# Patient Record
Sex: MALE | Race: WHITE | Employment: OTHER | URBAN - METROPOLITAN AREA
[De-identification: names, ages, dates, MRNs, and addresses within clinical notes are randomized per-mention and may not be internally consistent; named-entity substitution may affect disease eponyms.]

---

## 2022-09-28 ENCOUNTER — HOSPITAL ENCOUNTER (OUTPATIENT)
Dept: PREADMISSION TESTING | Age: 72
Discharge: HOME | End: 2022-09-28
Payer: MEDICARE

## 2022-09-28 ENCOUNTER — HOSPITAL ENCOUNTER (OUTPATIENT)
Dept: GENERAL RADIOLOGY | Age: 72
Discharge: HOME | End: 2022-09-28
Attending: UROLOGY
Payer: MEDICARE

## 2022-09-28 VITALS — BODY MASS INDEX: 29.37 KG/M2 | WEIGHT: 193.78 LBS | HEIGHT: 68 IN

## 2022-09-28 LAB
ABO + RH BLD: NORMAL
ALBUMIN SERPL-MCNC: 3.8 G/DL (ref 3.5–5)
ALBUMIN/GLOB SERPL: 1.1 {RATIO} (ref 1.1–2.2)
ALP SERPL-CCNC: 96 U/L (ref 45–117)
ALT SERPL-CCNC: 26 U/L (ref 12–78)
ANION GAP SERPL CALC-SCNC: 4 MMOL/L (ref 5–15)
APPEARANCE UR: CLEAR
APTT PPP: 27.9 SEC (ref 22.1–31)
AST SERPL-CCNC: 22 U/L (ref 15–37)
ATRIAL RATE: 75 BPM
BACTERIA URNS QL MICRO: NEGATIVE /HPF
BILIRUB SERPL-MCNC: 0.5 MG/DL (ref 0.2–1)
BILIRUB UR QL: NEGATIVE
BLOOD GROUP ANTIBODIES SERPL: NORMAL
BUN SERPL-MCNC: 19 MG/DL (ref 6–20)
BUN/CREAT SERPL: 15 (ref 12–20)
CALCIUM SERPL-MCNC: 8.9 MG/DL (ref 8.5–10.1)
CALCULATED P AXIS, ECG09: 37 DEGREES
CALCULATED R AXIS, ECG10: -13 DEGREES
CALCULATED T AXIS, ECG11: 41 DEGREES
CHLORIDE SERPL-SCNC: 106 MMOL/L (ref 97–108)
CO2 SERPL-SCNC: 29 MMOL/L (ref 21–32)
COLOR UR: ABNORMAL
CREAT SERPL-MCNC: 1.26 MG/DL (ref 0.7–1.3)
DIAGNOSIS, 93000: NORMAL
EPITH CASTS URNS QL MICRO: ABNORMAL /LPF
ERYTHROCYTE [DISTWIDTH] IN BLOOD BY AUTOMATED COUNT: 12.3 % (ref 11.5–14.5)
EST. AVERAGE GLUCOSE BLD GHB EST-MCNC: 123 MG/DL
GLOBULIN SER CALC-MCNC: 3.4 G/DL (ref 2–4)
GLUCOSE SERPL-MCNC: 102 MG/DL (ref 65–100)
GLUCOSE UR STRIP.AUTO-MCNC: NEGATIVE MG/DL
HBA1C MFR BLD: 5.9 % (ref 4–5.6)
HCT VFR BLD AUTO: 45.2 % (ref 36.6–50.3)
HGB BLD-MCNC: 15.1 G/DL (ref 12.1–17)
HGB UR QL STRIP: NEGATIVE
HYALINE CASTS URNS QL MICRO: ABNORMAL /LPF (ref 0–2)
INR PPP: 1 (ref 0.9–1.1)
KETONES UR QL STRIP.AUTO: ABNORMAL MG/DL
LEUKOCYTE ESTERASE UR QL STRIP.AUTO: NEGATIVE
MAGNESIUM SERPL-MCNC: 2.2 MG/DL (ref 1.6–2.4)
MCH RBC QN AUTO: 30.8 PG (ref 26–34)
MCHC RBC AUTO-ENTMCNC: 33.4 G/DL (ref 30–36.5)
MCV RBC AUTO: 92.2 FL (ref 80–99)
NITRITE UR QL STRIP.AUTO: NEGATIVE
NRBC # BLD: 0 K/UL (ref 0–0.01)
NRBC BLD-RTO: 0 PER 100 WBC
P-R INTERVAL, ECG05: 152 MS
PH UR STRIP: 5.5 [PH] (ref 5–8)
PHOSPHATE SERPL-MCNC: 2.5 MG/DL (ref 2.6–4.7)
PLATELET # BLD AUTO: 174 K/UL (ref 150–400)
PMV BLD AUTO: 11.1 FL (ref 8.9–12.9)
POTASSIUM SERPL-SCNC: 4.3 MMOL/L (ref 3.5–5.1)
PROT SERPL-MCNC: 7.2 G/DL (ref 6.4–8.2)
PROT UR STRIP-MCNC: NEGATIVE MG/DL
PROTHROMBIN TIME: 10.1 SEC (ref 9–11.1)
Q-T INTERVAL, ECG07: 418 MS
QRS DURATION, ECG06: 96 MS
QTC CALCULATION (BEZET), ECG08: 466 MS
RBC # BLD AUTO: 4.9 M/UL (ref 4.1–5.7)
RBC #/AREA URNS HPF: ABNORMAL /HPF (ref 0–5)
SODIUM SERPL-SCNC: 139 MMOL/L (ref 136–145)
SP GR UR REFRACTOMETRY: 1.02
SPECIMEN EXP DATE BLD: NORMAL
THERAPEUTIC RANGE,PTTT: NORMAL SECS (ref 58–77)
UA: UC IF INDICATED,UAUC: ABNORMAL
UROBILINOGEN UR QL STRIP.AUTO: 0.2 EU/DL (ref 0.2–1)
VENTRICULAR RATE, ECG03: 75 BPM
WBC # BLD AUTO: 5.3 K/UL (ref 4.1–11.1)
WBC URNS QL MICRO: ABNORMAL /HPF (ref 0–4)

## 2022-09-28 PROCEDURE — 85610 PROTHROMBIN TIME: CPT

## 2022-09-28 PROCEDURE — 71046 X-RAY EXAM CHEST 2 VIEWS: CPT

## 2022-09-28 PROCEDURE — 85027 COMPLETE CBC AUTOMATED: CPT

## 2022-09-28 PROCEDURE — 93005 ELECTROCARDIOGRAM TRACING: CPT

## 2022-09-28 PROCEDURE — 83735 ASSAY OF MAGNESIUM: CPT

## 2022-09-28 PROCEDURE — 83036 HEMOGLOBIN GLYCOSYLATED A1C: CPT

## 2022-09-28 PROCEDURE — 85730 THROMBOPLASTIN TIME PARTIAL: CPT

## 2022-09-28 PROCEDURE — 80053 COMPREHEN METABOLIC PANEL: CPT

## 2022-09-28 PROCEDURE — 36415 COLL VENOUS BLD VENIPUNCTURE: CPT

## 2022-09-28 PROCEDURE — 84100 ASSAY OF PHOSPHORUS: CPT

## 2022-09-28 PROCEDURE — 81001 URINALYSIS AUTO W/SCOPE: CPT

## 2022-09-28 PROCEDURE — 86900 BLOOD TYPING SEROLOGIC ABO: CPT

## 2022-09-28 RX ORDER — SODIUM CHLORIDE, SODIUM LACTATE, POTASSIUM CHLORIDE, CALCIUM CHLORIDE 600; 310; 30; 20 MG/100ML; MG/100ML; MG/100ML; MG/100ML
25 INJECTION, SOLUTION INTRAVENOUS CONTINUOUS
Status: CANCELLED | OUTPATIENT
Start: 2022-10-12

## 2022-09-28 RX ORDER — ACETAMINOPHEN 500 MG
1000 TABLET ORAL ONCE
Status: CANCELLED | OUTPATIENT
Start: 2022-09-28 | End: 2022-09-28

## 2022-09-28 RX ORDER — HEPARIN SODIUM 5000 [USP'U]/ML
5000 INJECTION, SOLUTION INTRAVENOUS; SUBCUTANEOUS EVERY 8 HOURS
Status: CANCELLED | OUTPATIENT
Start: 2022-09-28

## 2022-09-28 RX ORDER — ASPIRIN 81 MG/1
81 TABLET ORAL DAILY
COMMUNITY
End: 2022-10-18

## 2022-09-28 RX ORDER — GABAPENTIN 300 MG/1
600 CAPSULE ORAL ONCE
Status: CANCELLED | OUTPATIENT
Start: 2022-09-28 | End: 2022-09-28

## 2022-09-28 RX ORDER — MV-MIN/VIT C/GLUT/LYSINE/HB124 1000-50 MG
1 TABLET, EFFERVESCENT ORAL DAILY
COMMUNITY

## 2022-09-28 RX ORDER — SODIUM CHLORIDE, SODIUM LACTATE, POTASSIUM CHLORIDE, CALCIUM CHLORIDE 600; 310; 30; 20 MG/100ML; MG/100ML; MG/100ML; MG/100ML
75 INJECTION, SOLUTION INTRAVENOUS CONTINUOUS
Status: CANCELLED | OUTPATIENT
Start: 2022-09-28 | End: 2022-09-29

## 2022-09-28 NOTE — PERIOP NOTES
Hibiclens/Chlorhexidine    Preventing Infections Before and After - Your Surgery    IMPORTANT INSTRUCTIONS    Please read and follow these instructions carefully. If you are unable to comply with the below instructions your procedure will be cancelled. Every Night for Three (3) nights before your surgery:  Shower with an antibacterial soap, such as Dial, or the soap provided at your preassessment appointment. A shower is better than a bath for cleaning your skin. If needed, ask someone to help you reach all areas of your body. Dont forget to clean your belly button with every shower. The night before your surgery: If you lose your Hibiclens/chlorhexidine please contact surgery center or you can purchase it at a local pharmacy  On the night before your surgery, shower with an antibacterial soap, such as Dial, or the soap provided at your preassessment appointment. With one packet of Hibiclens/Chlorhexidine in hand, turn water off. Apply Hibiclens antiseptic skin cleanser with a clean, freshly washed washcloth. Gently apply to your body from chin to toes (except the genital area) and especially the area(s) where your incision(s) will be. Leave Hibiclens/Chlorhexidine on your skin for at least 20 seconds. CAUTION: If needed, Hibiclens/chlorhexidine may be used to clean the folds of skin of the legs (such as in the area of the groin) and on your buttocks and hips. However, do not use Hibiclens/Chlorhexidine above the neck or in the genital area (your bottom) or put inside any area of your body. Turn the water back on and rinse. Dry gently with a clean, freshly washed towel. After your shower, do not use any powder, deodorant, perfumes or lotion. Use clean, freshly washed towels and washcloths every time you shower. Wear clean, freshly washed pajamas to bed the night before surgery. Sleep on clean, freshly washed sheets. Do not allow pets to sleep in your bed with you.         The Morning of your surgery:  Shower again thoroughly with an antibacterial soap, such as Dial or the soap provided at your preassessment appointment. If needed, ask someone for help to reach all areas of your body. Dont forget to clean your belly button! Rinse. Dry gently with a clean, freshly washed towel. After your shower, do not use any powder, deodorant, perfumes or lotion prior to surgery. Put on clean, freshly washed clothing. Tips to help prevent infections after your surgery:  Protect your surgical wound from germs:  Hand washing is the most important thing you and your caregivers can do to prevent infections. Keep your bandage clean and dry! Do not touch your surgical wound. Use clean, freshly washed towels and washcloths every time you shower; do not share bath linens with others. Until your surgical wound is healed, wear clothing and sleep on bed linens each day that are clean and freshly washed. Do not allow pets to sleep in your bed with you or touch your surgical wound. Do not smoke - smoking delays wound healing. This may be a good time to stop smoking. If you have diabetes, it is important for you to manage your blood sugar levels properly before your surgery as well as after your surgery. Poorly managed blood sugar levels slow down wound healing and prevent you from healing completely. If you lose your Hibiclens/chlorhexidine, please call the [facility], or it is available for purchase at your pharmacy.                ___________________      ___________________      ________________  (Signature of Patient)          (Witness)                   (Date and Time)

## 2022-09-28 NOTE — PERIOP NOTES
[facility]  Preoperative Instructions        Surgery Date 10/12/22          Time of Arrival to be called at [phone]    1. On the day of your surgery, please report to the Surgical Services Registration Desk and sign in at your designated time. The Surgery Center is located to the right of the Emergency Room. 2. You must have someone with you to drive you home. You should not drive a car for 24 hours following surgery. Please make arrangements for a friend or family member to stay with you for the first 24 hours after your surgery. 3. Refer to your handbook for instructions on drinking liquids prior to surgery. 4. We recommend you do not drink any alcoholic beverages for 24 hours before and after your surgery. 5. Contact your surgeons office for instructions on the following medications: non-steroidal anti-inflammatory drugs (i.e. Advil, Aleve), vitamins, and supplements. (Some surgeons will want you to stop these medications prior to surgery and others may allow you to take them)  **If you are currently taking Plavix, Coumadin, Aspirin and/or other blood-thinning agents, contact your surgeon for instructions. ** Your surgeon will partner with the physician prescribing these medications to determine if it is safe to stop or if you need to continue taking. Please do not stop taking these medications without instructions from your surgeon    6. Wear comfortable clothes. Wear glasses instead of contacts. Do not bring any money or jewelry. Please bring picture ID, insurance card, and any prearranged co-payment or hospital payment. Do not wear make-up, particularly mascara the morning of your surgery. Do not wear nail polish, particularly if you are having foot /hand surgery. Wear your hair loose or down, no ponytails, buns, joyce pins or clips. All body piercings must be removed.   Please shower with antibacterial soap for three consecutive days before and on the morning of surgery, but do not apply any lotions, powders or deodorants after the shower on the day of surgery. Please use a fresh towels after each shower. Please sleep in clean clothes and change bed linens the night before surgery. Please do not shave for 48 hours prior to surgery. Shaving of the face is acceptable. 7. You should understand that if you do not follow these instructions your surgery may be cancelled. If your physical condition changes (I.e. fever, cold or flu) please contact your surgeon as soon as possible. 8. It is important that you be on time. If a situation occurs where you may be late, please call [phone] (OR Holding Area). 9. If you have any questions and or problems, please call [phone] (Pre-admission Testing). 10. Your surgery time may be subject to change. You will receive a phone call the evening prior if your time changes. 11.  If having outpatient surgery, you must have someone to drive you here, stay with you during the duration of your stay, and to drive you home at time of discharge. 12.   Due to current COVID restrictions, only two adults may accompany you the day of the procedure. We have limited seating available. If our waiting room is at capacity, your ride may be asked to remain in their vehicle. No children are allowed in the waiting room     Special Instructions: Stop taking Aspirin and vitamins and supplements 1 week prior to surgery per Dr. Mihai Gonzalez instructions    TAKE ALL MEDICATIONS THE DAY OF SURGERY EXCEPT: Aspirin , vitamins and supplements. I understand a pre-operative phone call will be made to verify my surgery time. In the event that I am not available, I give permission for a message to be left on my answering service and/or with another person?   yes         ___________________      __________   _________    (Signature of Patient)             (Witness)                (Date and Time)

## 2022-09-28 NOTE — PERIOP NOTES

## 2022-09-28 NOTE — PERIOP NOTES
The [employer] (ERAS) book was reviewed with the patient during the pre-admission testing (PAT) appointment. An opportunity for questions was provided, patient verbalized understanding.

## 2022-09-29 LAB
BACTERIA SPEC CULT: NORMAL
BACTERIA SPEC CULT: NORMAL
SERVICE CMNT-IMP: NORMAL

## 2022-10-12 ENCOUNTER — HOSPITAL ENCOUNTER (INPATIENT)
Age: 72
LOS: 6 days | Discharge: HOME | DRG: 708 | End: 2022-10-18
Attending: UROLOGY | Admitting: UROLOGY
Payer: MEDICARE

## 2022-10-12 ENCOUNTER — ANESTHESIA (OUTPATIENT)
Dept: SURGERY | Age: 72
DRG: 708 | End: 2022-10-12
Payer: MEDICARE

## 2022-10-12 ENCOUNTER — ANESTHESIA EVENT (OUTPATIENT)
Dept: SURGERY | Age: 72
DRG: 708 | End: 2022-10-12
Payer: MEDICARE

## 2022-10-12 DIAGNOSIS — C61 PROSTATE CANCER (HCC): Primary | ICD-10-CM

## 2022-10-12 PROCEDURE — 77030014647 HC SEAL FBRN TISSL BAXT -D: Performed by: UROLOGY

## 2022-10-12 PROCEDURE — 77030035029 HC NDL INSUF VERES DISP COVD -B: Performed by: UROLOGY

## 2022-10-12 PROCEDURE — 0WQF4ZZ REPAIR ABDOMINAL WALL, PERCUTANEOUS ENDOSCOPIC APPROACH: ICD-10-PCS | Performed by: UROLOGY

## 2022-10-12 PROCEDURE — P9045 ALBUMIN (HUMAN), 5%, 250 ML: HCPCS | Performed by: REGISTERED NURSE

## 2022-10-12 PROCEDURE — 77030037241 HC PRT ACC BLDLSS AIRSEAL CNMD -B: Performed by: UROLOGY

## 2022-10-12 PROCEDURE — 77030035279 HC SEAL VSL ENDOWR XI INTU -I2: Performed by: UROLOGY

## 2022-10-12 PROCEDURE — 77030020703 HC SEAL CANN DISP INTU -B: Performed by: UROLOGY

## 2022-10-12 PROCEDURE — 07BC4ZZ EXCISION OF PELVIS LYMPHATIC, PERCUTANEOUS ENDOSCOPIC APPROACH: ICD-10-PCS | Performed by: UROLOGY

## 2022-10-12 PROCEDURE — 77030012407 HC DRN WND BARD -B: Performed by: UROLOGY

## 2022-10-12 PROCEDURE — 77030035277 HC OBTRTR BLDELSS DISP INTU -B: Performed by: UROLOGY

## 2022-10-12 PROCEDURE — 74011000258 HC RX REV CODE- 258: Performed by: UROLOGY

## 2022-10-12 PROCEDURE — 74011000250 HC RX REV CODE- 250: Performed by: UROLOGY

## 2022-10-12 PROCEDURE — 74011000250 HC RX REV CODE- 250: Performed by: REGISTERED NURSE

## 2022-10-12 PROCEDURE — 77030010507 HC ADH SKN DERMBND J&J -B: Performed by: UROLOGY

## 2022-10-12 PROCEDURE — 74011250636 HC RX REV CODE- 250/636: Performed by: UROLOGY

## 2022-10-12 PROCEDURE — 76060000038 HC ANESTHESIA 3.5 TO 4 HR: Performed by: UROLOGY

## 2022-10-12 PROCEDURE — 76210000000 HC OR PH I REC 2 TO 2.5 HR: Performed by: UROLOGY

## 2022-10-12 PROCEDURE — 74011250636 HC RX REV CODE- 250/636: Performed by: ANESTHESIOLOGY

## 2022-10-12 PROCEDURE — 77030008771 HC TU NG SALEM SUMP -A: Performed by: ANESTHESIOLOGY

## 2022-10-12 PROCEDURE — 74011250637 HC RX REV CODE- 250/637: Performed by: UROLOGY

## 2022-10-12 PROCEDURE — 65270000029 HC RM PRIVATE

## 2022-10-12 PROCEDURE — 77030038157 HC DEV PWR CNTR DISP SIGNIA COVD -C: Performed by: UROLOGY

## 2022-10-12 PROCEDURE — 77030019908 HC STETH ESOPH SIMS -A: Performed by: ANESTHESIOLOGY

## 2022-10-12 PROCEDURE — 74011000250 HC RX REV CODE- 250: Performed by: ANESTHESIOLOGY

## 2022-10-12 PROCEDURE — 77030013079 HC BLNKT BAIR HGGR 3M -A: Performed by: ANESTHESIOLOGY

## 2022-10-12 PROCEDURE — 77030010517 HC APPL SEAL FLOSEL BAXT -B: Performed by: UROLOGY

## 2022-10-12 PROCEDURE — 2709999900 HC NON-CHARGEABLE SUPPLY: Performed by: UROLOGY

## 2022-10-12 PROCEDURE — 77030012770 HC TRCR OPT FX AMR -B: Performed by: UROLOGY

## 2022-10-12 PROCEDURE — 8E0W4CZ ROBOTIC ASSISTED PROCEDURE OF TRUNK REGION, PERCUTANEOUS ENDOSCOPIC APPROACH: ICD-10-PCS | Performed by: UROLOGY

## 2022-10-12 PROCEDURE — 77030026438 HC STYL ET INTUB CARD -A: Performed by: ANESTHESIOLOGY

## 2022-10-12 PROCEDURE — 77030008684 HC TU ET CUF COVD -B: Performed by: ANESTHESIOLOGY

## 2022-10-12 PROCEDURE — 88309 TISSUE EXAM BY PATHOLOGIST: CPT

## 2022-10-12 PROCEDURE — 77030002933 HC SUT MCRYL J&J -A: Performed by: UROLOGY

## 2022-10-12 PROCEDURE — 74011250636 HC RX REV CODE- 250/636: Performed by: REGISTERED NURSE

## 2022-10-12 PROCEDURE — 88305 TISSUE EXAM BY PATHOLOGIST: CPT

## 2022-10-12 PROCEDURE — 0VT04ZZ RESECTION OF PROSTATE, PERCUTANEOUS ENDOSCOPIC APPROACH: ICD-10-PCS | Performed by: UROLOGY

## 2022-10-12 PROCEDURE — 77030002966 HC SUT PDS J&J -A: Performed by: UROLOGY

## 2022-10-12 PROCEDURE — 77030019903 HC CATH URET INT BARD -A: Performed by: UROLOGY

## 2022-10-12 PROCEDURE — 77030020061 HC IV BLD WRMR ADMIN SET 3M -B: Performed by: ANESTHESIOLOGY

## 2022-10-12 PROCEDURE — 76010000879 HC OR TIME 3.5 TO 4HR INTENSV - TIER 2: Performed by: UROLOGY

## 2022-10-12 PROCEDURE — 77030009851 HC PCH RTVR ENDOSC AMR -B: Performed by: UROLOGY

## 2022-10-12 PROCEDURE — 77030018390 HC SPNG HEMSTAT2 J&J -B: Performed by: UROLOGY

## 2022-10-12 RX ORDER — HEPARIN SODIUM 5000 [USP'U]/ML
5000 INJECTION, SOLUTION INTRAVENOUS; SUBCUTANEOUS EVERY 8 HOURS
Status: DISCONTINUED | OUTPATIENT
Start: 2022-10-12 | End: 2022-10-12 | Stop reason: HOSPADM

## 2022-10-12 RX ORDER — SUCCINYLCHOLINE CHLORIDE 20 MG/ML
INJECTION INTRAMUSCULAR; INTRAVENOUS AS NEEDED
Status: DISCONTINUED | OUTPATIENT
Start: 2022-10-12 | End: 2022-10-12 | Stop reason: HOSPADM

## 2022-10-12 RX ORDER — ONDANSETRON 2 MG/ML
INJECTION INTRAMUSCULAR; INTRAVENOUS AS NEEDED
Status: DISCONTINUED | OUTPATIENT
Start: 2022-10-12 | End: 2022-10-12 | Stop reason: HOSPADM

## 2022-10-12 RX ORDER — SODIUM CHLORIDE, SODIUM LACTATE, POTASSIUM CHLORIDE, CALCIUM CHLORIDE 600; 310; 30; 20 MG/100ML; MG/100ML; MG/100ML; MG/100ML
25 INJECTION, SOLUTION INTRAVENOUS CONTINUOUS
Status: DISCONTINUED | OUTPATIENT
Start: 2022-10-12 | End: 2022-10-12 | Stop reason: HOSPADM

## 2022-10-12 RX ORDER — NALOXONE HYDROCHLORIDE 0.4 MG/ML
0.4 INJECTION, SOLUTION INTRAMUSCULAR; INTRAVENOUS; SUBCUTANEOUS AS NEEDED
Status: DISCONTINUED | OUTPATIENT
Start: 2022-10-12 | End: 2022-10-18 | Stop reason: HOSPADM

## 2022-10-12 RX ORDER — DIPHENHYDRAMINE HCL 25 MG
25 CAPSULE ORAL
Status: DISCONTINUED | OUTPATIENT
Start: 2022-10-12 | End: 2022-10-18 | Stop reason: HOSPADM

## 2022-10-12 RX ORDER — SODIUM CHLORIDE 0.9 % (FLUSH) 0.9 %
5-40 SYRINGE (ML) INJECTION EVERY 8 HOURS
Status: DISCONTINUED | OUTPATIENT
Start: 2022-10-12 | End: 2022-10-12 | Stop reason: HOSPADM

## 2022-10-12 RX ORDER — SODIUM CHLORIDE, SODIUM LACTATE, POTASSIUM CHLORIDE, CALCIUM CHLORIDE 600; 310; 30; 20 MG/100ML; MG/100ML; MG/100ML; MG/100ML
25 INJECTION, SOLUTION INTRAVENOUS CONTINUOUS
Status: DISCONTINUED | OUTPATIENT
Start: 2022-10-12 | End: 2022-10-13

## 2022-10-12 RX ORDER — HYDROMORPHONE HYDROCHLORIDE 1 MG/ML
.2-.5 INJECTION, SOLUTION INTRAMUSCULAR; INTRAVENOUS; SUBCUTANEOUS
Status: DISCONTINUED | OUTPATIENT
Start: 2022-10-12 | End: 2022-10-12 | Stop reason: HOSPADM

## 2022-10-12 RX ORDER — KETOROLAC TROMETHAMINE 30 MG/ML
INJECTION, SOLUTION INTRAMUSCULAR; INTRAVENOUS AS NEEDED
Status: DISCONTINUED | OUTPATIENT
Start: 2022-10-12 | End: 2022-10-12 | Stop reason: HOSPADM

## 2022-10-12 RX ORDER — ALBUMIN HUMAN 50 G/1000ML
SOLUTION INTRAVENOUS AS NEEDED
Status: DISCONTINUED | OUTPATIENT
Start: 2022-10-12 | End: 2022-10-12 | Stop reason: HOSPADM

## 2022-10-12 RX ORDER — OXYCODONE HYDROCHLORIDE 5 MG/1
5 TABLET ORAL
Status: DISCONTINUED | OUTPATIENT
Start: 2022-10-12 | End: 2022-10-18 | Stop reason: HOSPADM

## 2022-10-12 RX ORDER — ACETAMINOPHEN 500 MG
1000 TABLET ORAL ONCE
Status: COMPLETED | OUTPATIENT
Start: 2022-10-12 | End: 2022-10-12

## 2022-10-12 RX ORDER — KETOROLAC TROMETHAMINE 30 MG/ML
15 INJECTION, SOLUTION INTRAMUSCULAR; INTRAVENOUS
Status: DISPENSED | OUTPATIENT
Start: 2022-10-12 | End: 2022-10-13

## 2022-10-12 RX ORDER — DEXTROSE MONOHYDRATE AND SODIUM CHLORIDE 5; .9 G/100ML; G/100ML
1000 INJECTION, SOLUTION INTRAVENOUS CONTINUOUS
Status: DISCONTINUED | OUTPATIENT
Start: 2022-10-12 | End: 2022-10-13

## 2022-10-12 RX ORDER — TROSPIUM CHLORIDE 20 MG/1
20 TABLET, FILM COATED ORAL
Status: DISCONTINUED | OUTPATIENT
Start: 2022-10-12 | End: 2022-10-16

## 2022-10-12 RX ORDER — SODIUM CHLORIDE, SODIUM LACTATE, POTASSIUM CHLORIDE, CALCIUM CHLORIDE 600; 310; 30; 20 MG/100ML; MG/100ML; MG/100ML; MG/100ML
75 INJECTION, SOLUTION INTRAVENOUS CONTINUOUS
Status: DISCONTINUED | OUTPATIENT
Start: 2022-10-12 | End: 2022-10-12 | Stop reason: HOSPADM

## 2022-10-12 RX ORDER — MAGNESIUM SULFATE HEPTAHYDRATE 40 MG/ML
INJECTION, SOLUTION INTRAVENOUS AS NEEDED
Status: DISCONTINUED | OUTPATIENT
Start: 2022-10-12 | End: 2022-10-12 | Stop reason: HOSPADM

## 2022-10-12 RX ORDER — ONDANSETRON 2 MG/ML
4 INJECTION INTRAMUSCULAR; INTRAVENOUS AS NEEDED
Status: DISCONTINUED | OUTPATIENT
Start: 2022-10-12 | End: 2022-10-12 | Stop reason: HOSPADM

## 2022-10-12 RX ORDER — MIDAZOLAM HYDROCHLORIDE 1 MG/ML
INJECTION, SOLUTION INTRAMUSCULAR; INTRAVENOUS AS NEEDED
Status: DISCONTINUED | OUTPATIENT
Start: 2022-10-12 | End: 2022-10-12 | Stop reason: HOSPADM

## 2022-10-12 RX ORDER — LIDOCAINE HYDROCHLORIDE 10 MG/ML
0.1 INJECTION, SOLUTION EPIDURAL; INFILTRATION; INTRACAUDAL; PERINEURAL AS NEEDED
Status: DISCONTINUED | OUTPATIENT
Start: 2022-10-12 | End: 2022-10-18 | Stop reason: HOSPADM

## 2022-10-12 RX ORDER — CIPROFLOXACIN 500 MG/1
500 TABLET ORAL 2 TIMES DAILY
Qty: 10 TABLET | Refills: 0 | Status: SHIPPED | OUTPATIENT
Start: 2022-10-12 | End: 2022-10-15

## 2022-10-12 RX ORDER — ROCURONIUM BROMIDE 10 MG/ML
INJECTION, SOLUTION INTRAVENOUS AS NEEDED
Status: DISCONTINUED | OUTPATIENT
Start: 2022-10-12 | End: 2022-10-12 | Stop reason: HOSPADM

## 2022-10-12 RX ORDER — DEXAMETHASONE SODIUM PHOSPHATE 4 MG/ML
INJECTION, SOLUTION INTRA-ARTICULAR; INTRALESIONAL; INTRAMUSCULAR; INTRAVENOUS; SOFT TISSUE AS NEEDED
Status: DISCONTINUED | OUTPATIENT
Start: 2022-10-12 | End: 2022-10-12 | Stop reason: HOSPADM

## 2022-10-12 RX ORDER — LIDOCAINE HYDROCHLORIDE ANHYDROUS AND DEXTROSE MONOHYDRATE .8; 5 G/100ML; G/100ML
INJECTION, SOLUTION INTRAVENOUS
Status: DISCONTINUED | OUTPATIENT
Start: 2022-10-12 | End: 2022-10-12 | Stop reason: HOSPADM

## 2022-10-12 RX ORDER — MIDAZOLAM HYDROCHLORIDE 1 MG/ML
0.5 INJECTION, SOLUTION INTRAMUSCULAR; INTRAVENOUS
Status: DISCONTINUED | OUTPATIENT
Start: 2022-10-12 | End: 2022-10-12 | Stop reason: HOSPADM

## 2022-10-12 RX ORDER — DOCUSATE SODIUM 100 MG/1
100 CAPSULE, LIQUID FILLED ORAL 2 TIMES DAILY
Status: DISCONTINUED | OUTPATIENT
Start: 2022-10-12 | End: 2022-10-18 | Stop reason: HOSPADM

## 2022-10-12 RX ORDER — FENTANYL CITRATE 50 UG/ML
50 INJECTION, SOLUTION INTRAMUSCULAR; INTRAVENOUS AS NEEDED
Status: DISCONTINUED | OUTPATIENT
Start: 2022-10-12 | End: 2022-10-14

## 2022-10-12 RX ORDER — DIPHENHYDRAMINE HYDROCHLORIDE 50 MG/ML
12.5 INJECTION, SOLUTION INTRAMUSCULAR; INTRAVENOUS AS NEEDED
Status: DISCONTINUED | OUTPATIENT
Start: 2022-10-12 | End: 2022-10-12 | Stop reason: HOSPADM

## 2022-10-12 RX ORDER — FENTANYL CITRATE 50 UG/ML
25 INJECTION, SOLUTION INTRAMUSCULAR; INTRAVENOUS
Status: DISCONTINUED | OUTPATIENT
Start: 2022-10-12 | End: 2022-10-12 | Stop reason: HOSPADM

## 2022-10-12 RX ORDER — SODIUM CHLORIDE 0.9 % (FLUSH) 0.9 %
5-40 SYRINGE (ML) INJECTION AS NEEDED
Status: DISCONTINUED | OUTPATIENT
Start: 2022-10-12 | End: 2022-10-12 | Stop reason: HOSPADM

## 2022-10-12 RX ORDER — FENTANYL CITRATE 50 UG/ML
25 INJECTION, SOLUTION INTRAMUSCULAR; INTRAVENOUS
Status: COMPLETED | OUTPATIENT
Start: 2022-10-12 | End: 2022-10-12

## 2022-10-12 RX ORDER — SODIUM CHLORIDE 0.9 % (FLUSH) 0.9 %
5-40 SYRINGE (ML) INJECTION AS NEEDED
Status: DISCONTINUED | OUTPATIENT
Start: 2022-10-12 | End: 2022-10-18 | Stop reason: HOSPADM

## 2022-10-12 RX ORDER — LANOLIN ALCOHOL/MO/W.PET/CERES
3 CREAM (GRAM) TOPICAL
Status: DISCONTINUED | OUTPATIENT
Start: 2022-10-12 | End: 2022-10-18 | Stop reason: HOSPADM

## 2022-10-12 RX ORDER — KETAMINE HYDROCHLORIDE 50 MG/ML
INJECTION, SOLUTION INTRAMUSCULAR; INTRAVENOUS AS NEEDED
Status: DISCONTINUED | OUTPATIENT
Start: 2022-10-12 | End: 2022-10-12 | Stop reason: HOSPADM

## 2022-10-12 RX ORDER — LIDOCAINE HYDROCHLORIDE 20 MG/ML
INJECTION, SOLUTION EPIDURAL; INFILTRATION; INTRACAUDAL; PERINEURAL AS NEEDED
Status: DISCONTINUED | OUTPATIENT
Start: 2022-10-12 | End: 2022-10-12 | Stop reason: HOSPADM

## 2022-10-12 RX ORDER — SODIUM CHLORIDE 0.9 % (FLUSH) 0.9 %
5-40 SYRINGE (ML) INJECTION EVERY 8 HOURS
Status: DISCONTINUED | OUTPATIENT
Start: 2022-10-12 | End: 2022-10-18 | Stop reason: HOSPADM

## 2022-10-12 RX ORDER — FENTANYL CITRATE 50 UG/ML
25 INJECTION, SOLUTION INTRAMUSCULAR; INTRAVENOUS
Status: DISCONTINUED | OUTPATIENT
Start: 2022-10-12 | End: 2022-10-14

## 2022-10-12 RX ORDER — DOCUSATE SODIUM 100 MG/1
100 CAPSULE, LIQUID FILLED ORAL 2 TIMES DAILY
Qty: 60 CAPSULE | Refills: 2 | Status: SHIPPED | OUTPATIENT
Start: 2022-10-12 | End: 2023-01-10

## 2022-10-12 RX ORDER — MORPHINE SULFATE 2 MG/ML
2 INJECTION, SOLUTION INTRAMUSCULAR; INTRAVENOUS
Status: DISCONTINUED | OUTPATIENT
Start: 2022-10-12 | End: 2022-10-12 | Stop reason: HOSPADM

## 2022-10-12 RX ORDER — BUPIVACAINE HYDROCHLORIDE 5 MG/ML
INJECTION, SOLUTION EPIDURAL; INTRACAUDAL AS NEEDED
Status: DISCONTINUED | OUTPATIENT
Start: 2022-10-12 | End: 2022-10-12 | Stop reason: HOSPADM

## 2022-10-12 RX ORDER — GABAPENTIN 300 MG/1
600 CAPSULE ORAL ONCE
Status: COMPLETED | OUTPATIENT
Start: 2022-10-12 | End: 2022-10-12

## 2022-10-12 RX ORDER — PROPOFOL 10 MG/ML
INJECTION, EMULSION INTRAVENOUS AS NEEDED
Status: DISCONTINUED | OUTPATIENT
Start: 2022-10-12 | End: 2022-10-12 | Stop reason: HOSPADM

## 2022-10-12 RX ORDER — ONDANSETRON 2 MG/ML
4 INJECTION INTRAMUSCULAR; INTRAVENOUS
Status: DISCONTINUED | OUTPATIENT
Start: 2022-10-12 | End: 2022-10-18 | Stop reason: HOSPADM

## 2022-10-12 RX ORDER — HYDROCODONE BITARTRATE AND ACETAMINOPHEN 5; 325 MG/1; MG/1
1 TABLET ORAL
Qty: 20 TABLET | Refills: 0 | Status: SHIPPED | OUTPATIENT
Start: 2022-10-12 | End: 2022-10-15

## 2022-10-12 RX ORDER — FENTANYL CITRATE 50 UG/ML
INJECTION, SOLUTION INTRAMUSCULAR; INTRAVENOUS AS NEEDED
Status: DISCONTINUED | OUTPATIENT
Start: 2022-10-12 | End: 2022-10-12 | Stop reason: HOSPADM

## 2022-10-12 RX ORDER — MIDAZOLAM HYDROCHLORIDE 1 MG/ML
1 INJECTION, SOLUTION INTRAMUSCULAR; INTRAVENOUS AS NEEDED
Status: DISCONTINUED | OUTPATIENT
Start: 2022-10-12 | End: 2022-10-12 | Stop reason: HOSPADM

## 2022-10-12 RX ADMIN — SODIUM CHLORIDE 120 MCG: 900 INJECTION, SOLUTION INTRAVENOUS at 08:40

## 2022-10-12 RX ADMIN — SODIUM CHLORIDE, POTASSIUM CHLORIDE, SODIUM LACTATE AND CALCIUM CHLORIDE 25 ML/HR: 600; 310; 30; 20 INJECTION, SOLUTION INTRAVENOUS at 07:09

## 2022-10-12 RX ADMIN — SODIUM CHLORIDE 120 MCG: 900 INJECTION, SOLUTION INTRAVENOUS at 09:55

## 2022-10-12 RX ADMIN — SODIUM CHLORIDE, POTASSIUM CHLORIDE, SODIUM LACTATE AND CALCIUM CHLORIDE: 600; 310; 30; 20 INJECTION, SOLUTION INTRAVENOUS at 08:16

## 2022-10-12 RX ADMIN — CEFAZOLIN 1 G: 1 INJECTION, POWDER, FOR SOLUTION INTRAMUSCULAR; INTRAVENOUS at 23:35

## 2022-10-12 RX ADMIN — KETOROLAC TROMETHAMINE 15 MG: 30 INJECTION, SOLUTION INTRAMUSCULAR; INTRAVENOUS at 11:48

## 2022-10-12 RX ADMIN — Medication 1 AMPULE: at 12:00

## 2022-10-12 RX ADMIN — SODIUM CHLORIDE, POTASSIUM CHLORIDE, SODIUM LACTATE AND CALCIUM CHLORIDE 25 ML/HR: 600; 310; 30; 20 INJECTION, SOLUTION INTRAVENOUS at 15:30

## 2022-10-12 RX ADMIN — SODIUM CHLORIDE 120 MCG: 900 INJECTION, SOLUTION INTRAVENOUS at 09:38

## 2022-10-12 RX ADMIN — KETOROLAC TROMETHAMINE 15 MG: 30 INJECTION, SOLUTION INTRAMUSCULAR at 21:57

## 2022-10-12 RX ADMIN — SODIUM CHLORIDE 120 MCG: 900 INJECTION, SOLUTION INTRAVENOUS at 08:56

## 2022-10-12 RX ADMIN — SODIUM CHLORIDE, PRESERVATIVE FREE 10 ML: 5 INJECTION INTRAVENOUS at 21:57

## 2022-10-12 RX ADMIN — SODIUM CHLORIDE, POTASSIUM CHLORIDE, SODIUM LACTATE AND CALCIUM CHLORIDE: 600; 310; 30; 20 INJECTION, SOLUTION INTRAVENOUS at 10:17

## 2022-10-12 RX ADMIN — MIDAZOLAM HYDROCHLORIDE 1 MG: 1 INJECTION, SOLUTION INTRAMUSCULAR; INTRAVENOUS at 08:12

## 2022-10-12 RX ADMIN — LIDOCAINE HYDROCHLORIDE 70 MG: 20 INJECTION, SOLUTION EPIDURAL; INFILTRATION; INTRACAUDAL; PERINEURAL at 08:21

## 2022-10-12 RX ADMIN — ONDANSETRON 4 MG: 2 INJECTION INTRAMUSCULAR; INTRAVENOUS at 18:26

## 2022-10-12 RX ADMIN — SUCCINYLCHOLINE CHLORIDE 120 MG: 20 INJECTION, SOLUTION INTRAMUSCULAR; INTRAVENOUS at 08:22

## 2022-10-12 RX ADMIN — FENTANYL CITRATE 25 MCG: 50 INJECTION, SOLUTION INTRAMUSCULAR; INTRAVENOUS at 13:25

## 2022-10-12 RX ADMIN — FENTANYL CITRATE 50 MCG: 50 INJECTION, SOLUTION INTRAMUSCULAR; INTRAVENOUS at 11:47

## 2022-10-12 RX ADMIN — SODIUM CHLORIDE 80 MCG: 900 INJECTION, SOLUTION INTRAVENOUS at 08:45

## 2022-10-12 RX ADMIN — FENTANYL CITRATE 25 MCG: 50 INJECTION, SOLUTION INTRAMUSCULAR; INTRAVENOUS at 15:53

## 2022-10-12 RX ADMIN — PROPOFOL 150 MG: 10 INJECTION, EMULSION INTRAVENOUS at 08:21

## 2022-10-12 RX ADMIN — DEXTROSE AND SODIUM CHLORIDE 1000 ML: 5; 900 INJECTION, SOLUTION INTRAVENOUS at 15:30

## 2022-10-12 RX ADMIN — SODIUM CHLORIDE 80 MCG: 900 INJECTION, SOLUTION INTRAVENOUS at 09:34

## 2022-10-12 RX ADMIN — DEXTROSE AND SODIUM CHLORIDE 1000 ML: 5; 900 INJECTION, SOLUTION INTRAVENOUS at 12:33

## 2022-10-12 RX ADMIN — SODIUM CHLORIDE 80 MCG: 900 INJECTION, SOLUTION INTRAVENOUS at 08:59

## 2022-10-12 RX ADMIN — DOCUSATE SODIUM 100 MG: 100 CAPSULE ORAL at 18:27

## 2022-10-12 RX ADMIN — SODIUM CHLORIDE, PRESERVATIVE FREE 10 ML: 5 INJECTION INTRAVENOUS at 16:00

## 2022-10-12 RX ADMIN — SODIUM CHLORIDE 80 MCG: 900 INJECTION, SOLUTION INTRAVENOUS at 08:24

## 2022-10-12 RX ADMIN — SUGAMMADEX 200 MG: 100 INJECTION, SOLUTION INTRAVENOUS at 11:44

## 2022-10-12 RX ADMIN — FENTANYL CITRATE 25 MCG: 50 INJECTION, SOLUTION INTRAMUSCULAR; INTRAVENOUS at 13:21

## 2022-10-12 RX ADMIN — LIDOCAINE HYDROCHLORIDE 2 MG/KG/HR: 8 INJECTION, SOLUTION INTRAVENOUS at 08:28

## 2022-10-12 RX ADMIN — ROCURONIUM BROMIDE 10 MG: 10 INJECTION INTRAVENOUS at 11:30

## 2022-10-12 RX ADMIN — WATER 2 G: 1 INJECTION INTRAMUSCULAR; INTRAVENOUS; SUBCUTANEOUS at 08:12

## 2022-10-12 RX ADMIN — Medication 3 AMPULE: at 06:57

## 2022-10-12 RX ADMIN — FENTANYL CITRATE 50 MCG: 50 INJECTION, SOLUTION INTRAMUSCULAR; INTRAVENOUS at 08:21

## 2022-10-12 RX ADMIN — FENTANYL CITRATE 25 MCG: 50 INJECTION, SOLUTION INTRAMUSCULAR; INTRAVENOUS at 12:57

## 2022-10-12 RX ADMIN — DEXAMETHASONE SODIUM PHOSPHATE 8 MG: 4 INJECTION, SOLUTION INTRAMUSCULAR; INTRAVENOUS at 08:38

## 2022-10-12 RX ADMIN — SODIUM CHLORIDE 120 MCG: 900 INJECTION, SOLUTION INTRAVENOUS at 08:42

## 2022-10-12 RX ADMIN — ROCURONIUM BROMIDE 30 MG: 10 INJECTION INTRAVENOUS at 08:29

## 2022-10-12 RX ADMIN — KETAMINE HYDROCHLORIDE 40 MG: 50 INJECTION, SOLUTION, CONCENTRATE INTRAMUSCULAR; INTRAVENOUS at 08:40

## 2022-10-12 RX ADMIN — ONDANSETRON HYDROCHLORIDE 4 MG: 2 INJECTION, SOLUTION INTRAMUSCULAR; INTRAVENOUS at 08:38

## 2022-10-12 RX ADMIN — GABAPENTIN 600 MG: 300 CAPSULE ORAL at 06:56

## 2022-10-12 RX ADMIN — ROCURONIUM BROMIDE 10 MG: 10 INJECTION INTRAVENOUS at 08:21

## 2022-10-12 RX ADMIN — Medication 1000 MG: at 06:56

## 2022-10-12 RX ADMIN — CEFAZOLIN 1 G: 1 INJECTION, POWDER, FOR SOLUTION INTRAMUSCULAR; INTRAVENOUS at 17:03

## 2022-10-12 RX ADMIN — HEPARIN SODIUM 5000 UNITS: 5000 INJECTION INTRAVENOUS; SUBCUTANEOUS at 06:58

## 2022-10-12 RX ADMIN — OXYCODONE 5 MG: 5 TABLET ORAL at 18:30

## 2022-10-12 RX ADMIN — FENTANYL CITRATE 25 MCG: 50 INJECTION, SOLUTION INTRAMUSCULAR; INTRAVENOUS at 13:01

## 2022-10-12 RX ADMIN — ALBUMIN (HUMAN) 250 ML: 2.5 SOLUTION INTRAVENOUS at 09:58

## 2022-10-12 RX ADMIN — Medication 2 G: at 08:51

## 2022-10-12 RX ADMIN — PROPOFOL 50 MG: 10 INJECTION, EMULSION INTRAVENOUS at 11:30

## 2022-10-12 RX ADMIN — SODIUM CHLORIDE, PRESERVATIVE FREE 10 ML: 5 INJECTION INTRAVENOUS at 15:36

## 2022-10-12 NOTE — PROGRESS NOTES
Admission assessment completed by this RN upon arrival to inpatient unit. Care plan and education initiated. Yordy Mccullough LPN to assume care of patient at this time.        Queen Taryn

## 2022-10-12 NOTE — DISCHARGE INSTRUCTIONS
_           Laparoscopic Robotic Radical Prostatectomy:     What to Expect at Home      A laparoscopic robotic radical prostatectomy is an operation to remove the prostate gland and some of the tissue around it. It is done to treat prostate cancer that has not spread beyond the prostate. You have 6 incisions in your lower belly. The doctor put a lighted tube (scope) and other surgical tools through the incisions to do the surgery. The incision above your belly button was expanded slightly to remove the prostate intact at the end of the operation. Catheter: You have a catheter inserted up the penis into your bladder. The catheter is also known as a \"Suggs\" (after the person who invented it). Your urine will drain through the catheter. All you need to do to make sure that the urine comes out is keep the drainage bag below the bladder. Gravity will do the rest.      The catheter is uncomfortable, especially the first few days. Pain medication may help. Neosporin ointment applied to the catheter right where it exits the penis will limit the irritation of the opening of your penis. Use the ointment whenever you like. You may have bladder cramps, or spasms, while the catheter is in your bladder. I can give you medicine to help prevent bladder spasms, but these medications can cause constipation, so I try to avoid using them unless the spasms are severe. You will take home two drainage bags, one large \"night bag\" with a long drainage tube. This is intended for nights and times when you are resting at home during the day. The second bag is a small \"leg bag\" which attaches directly to the catheter. The leg bag attaches with straps to your thigh. The straps allow you to wear pants and keep the whole apparatus hidden. Both bags have a valve at the bottom which allows you to empty the bag without disconnecting the bag from the catheter.     Your nurse on the floor will teach you how to manage the drainage bags. Please ask for additional help if you have questions. The catheter is normally left in place for the first week after the operation. When you come in to the office next week, we will take an X ray to ensure that the place where the bladder and urethra are sewn together is watertight, and if so, the catheter will be removed. Drains:    Some patients are discharged home with a drain in place exiting from an incision in the right lower abdomen. If the drain is left in, you will be instructed by your nurse on how to empty the drain and \"recharge\" it so that it continues to work. When you empty the drain, measure and record the amount of fluid removed and the time and date of emptying. Keep a record of this information on a piece of paper. Bring that to all follow up appointments. This record will help me decide when to remove the drain. If the drain is left in place, there may be some fluid which leaks around it also. Get some gauze from the drugstore and keep a dry dressing around the drain tube. If the dressing is wet, change it. Leaving damp dressings on for too long may cause skin irritation and infection. Incisions:    I normally make six incisions. The largest one is above the belly button. Five of the six are closed with sutures below the skin, and then dressed with surgical glue. One is left open to allow for the drain to come through. The glued incisions require no special care. When you shower, water can run over them. No need for soap. Pat them dry. The single open incision will probably bleed for 48 hrs. Keep a gauze on it and change it if it is bloody or damp. Once the area is dry and scabbed over, no need to keep the gauze on there any more. Your scrotum may be swollen and bruised. This usually gets better after 1 to 2 weeks. The incisions may be sore for 1 to 2 weeks.  To help with pain and swelling, put ice or a cold pack on your groin for 10 to 15 minutes at a time. Put a thin cloth between the ice and your skin. I have given you a prescription for pain medicine. Bathing: You may shower 48 hours after discharge if the drain is removed. Keep the catheter connected to the leg bag when you are in the shower. Remove any remaining dressings before you shower and replace after if needed. Activity:    I encourage you to do plenty of walking at home. You may go up and downstairs once or twice a day. Walking will help reduce the risk of blood clots and pneumonia. It also helped get your bowels moving. The discomfort with walking the first day should gradually improve. I would like you to avoid heavy lifting of anything that requires you to work your abdominal muscles. Usually this is about 10 pounds or more. Avoid driving until the catheter is out and you are off of narcotics. You should be able to drive after one week. Normally people are able to return to sedentary work after 3-4 weeks, sometimes quicker. This depends on how you are feeling more than anything. Heavy physical activity should be limited for 6 weeks after the surgery. Diet    You should start with a primarily liquid diet and begin to add solid food a little at a time over the next several days. You will not pass gas usually until postoperative day 3. By the time you are passing gas you should be able to tolerate solid food fairly normally. Remember to start slow and work your way up. Often gas pains from excessive food consumption early on can be more painful than incisional pain. Medications    I will give you a prescription for antibiotics and pain medicine at the time of discharge. You should also  over-the-counter docusate and milk of magnesia to be used as needed. I will give a prescription for oxycodone, a narcotic, for breakthrough pain. Use Tylenol two tabs every 8 hrs for prevention of severe pain.   Add the oxycodone if needed if you have a breakthrough pain. Narcotics can cause constipation and also nausea and mild itching. These are expected side effects. You should try taking the medicine with some food to reduce the nausea. Constipation is hard to avoid but the best prevention is early walking and limiting the use of the medication as much as you can. I would like for you to make sure you do get enough pain medicine that you are able to walk. I have prescribed antibiotics to take when your urinary catheter is removed. Start the day prior to your follow up appointment. Take them twice a day as directed. Take the docusate, a stool softener, twice a day until 2 weeks postop. At that point you may taper off or stop it completely. I want your bowels to be moving easily during the first few weeks after surgery. You may take a milk of magnesia dose if you feel constipated on postoperative day 3. You may use it once daily thereafter if needed. General thoughts about Prostate Cancer Surgery    After surgery, you will no longer be able to father children. You may have difficulty getting an erection, and you may not be able to control when you urinate. Medicine or other treatments often can help these problems. These problems are common after surgery, and usually improve with time. When you return to see me at the postoperative visit, I will have your pathology report available. We will go over the report and discuss the implications. Pathology reports can help predict your risk of cancer recurrence after surgery. The report can also help guide us as we make decisions about any further treatment that may be necessary to control your cancer. Prostate cancer is somewhat unique because it has a marker, PSA, which is found in the blood only when there is prostate tissue present in your body. We will continue to measure your PSA regularly over the years after your surgery. It should decline to \"undetectable\" or less than 0.1.   If this number increases, this can indicate a possible recurrence of your cancer in the future. If this does occur, we will decide together how to handle the issue. PSA tests are usually done every 3 to 6 months for the first 2-3 years, and less often after that. Having cancer is scary. You may feel many emotions and need some help coping. It may help to talk with your family, friends, or a therapist about your feelings. You also can do things at home to make yourself feel better while you go through treatment. Call the [employer] (9-215.105.4005) or visit its Web site at [address] for more information. What to watch out for:    Call 911 anytime you think you may need emergency care. For example, call if:  · You pass out (lose consciousness). · You have severe trouble breathing. · You have sudden chest pain and shortness of breath, or you cough up blood. Call the physician on-call or the office:    Call for temperature greater than 101F. It is normal to have a low-grade fever and that will usually improve with some deep breathing exercises. Temperature over 101 may be a more significant warning sign. Call if you have vomiting. It is normal to have some bloating and poor appetite and cramping. However, vomiting is unusual. This may require further evaluation. Call if the urine in the catheter tube becomes thick and red, looking more like blood than just red tinged fluid. If the urine clogs up the tube you will be uncomfortable and feel as though you have a full bladder. I would like to see you for this so we can clear out the clots. Its normal for the urine to be red, the concerning sign is if the urine gets thick with blood. Call if you notice swelling of the legs or unusual discomfort in the legs. These are possible signs of blood clots in the leg veins. This is especially true if you have the symptoms on one side and not the other.  If blood clots are suspected in the leg veins, I would arrange for you to have an ultrasound at the emergency room to evaluate further. My office number is [phone]. After hours, the on call physician can be reached at [phone]. The answering service will take your call and page the on-call physician. He will call you back within about 20 minutes.

## 2022-10-12 NOTE — BRIEF OP NOTE
Brief Postoperative Note    Patient: Roel Valdes  YOB: 1950  MRN: [id number]    Date of Procedure: 10/12/2022     Pre-Op Diagnosis: Prostate cancer ([address] [place]    Post-Op Diagnosis: Same as preoperative diagnosis. Procedure(s):  ROBOTIC ASSISTED LAPAROSCOPIC PROSTATECTOMY, BILATERAL PELVIC LYMPH NODE DISSECTION, UMBILICAL HERNIA REPAIR    Surgeon(s):  Sander Ceja MD    Surgical Assistant: Surg Asst-1: Carlos Sarmiento    Anesthesia: General     Estimated Blood Loss (mL): 527JJ    Complications: None    Specimens:   ID Type Source Tests Collected by Time Destination   1 : Right Pelvic Lymph Node Preservative Lymph Node  Sander Ceja MD 10/12/2022 7850 Pathology   2 : Left Pelvic Lymph Node Preservative Lymph Node  Sander Ceja MD 10/12/2022 1004 Pathology   3 : Prostate Preservative Prostate  Sander Ceja MD 10/12/2022 1129 Pathology        Implants: * No implants in log *    Drains: * No LDAs found *    Findings: DARREL    Electronically Signed by Brandon Barragan MD on 10/12/2022 at 12:01 PM

## 2022-10-12 NOTE — PROGRESS NOTES
Problem: Falls - Risk of  Goal: *Absence of Falls  Description: Document Crispin Linojimi Fall Risk and appropriate interventions in the flowsheet.   Outcome: Progressing Towards Goal  Note: Fall Risk Interventions:                                Problem: Patient Education: Go to Patient Education Activity  Goal: Patient/Family Education  Outcome: Progressing Towards Goal     Problem: Pain  Goal: *Control of Pain  Outcome: Progressing Towards Goal  Goal: *PALLIATIVE CARE:  Alleviation of Pain  Outcome: Progressing Towards Goal

## 2022-10-12 NOTE — PROGRESS NOTES
Reason for Admission:  Prostate Cancer                     RUR Score:    2%    Plan for utilizing home health:    Declines      PCP: First and Last name:  Nora Suarez MD     Name of Practice:    Are you a current patient: Yes/No:    Approximate date of last visit: 2-3 years ago   Can you participate in a virtual visit with your PCP:                     Current Advanced Directive/Advance Care Plan: FULL CODE  CM confirmed with patient's wife that he is a Full Code      Healthcare Decision Maker:   Click here to complete [address] including selection of the Healthcare Decision Maker Relationship (ie \"Primary\")           Wife, Rhiannon Haas, [phone]                  Transition of Care Plan:                    CM spoke with patient's wife over the phone. Patient lives at home with his wife. Patient is independent in care and uses no DME. Patient's wife will be his transport. Patient uses the [facility] on [address] [facility]. Current Dispo: Home/self.

## 2022-10-12 NOTE — H&P
Juan Castillo is a 73year old male who presents today for \"RALP discussion\". He returns for follow-up. Mr. Tarun Miller is a 63year old male with a hx of BPH, elevated PSA and abnormal MRI, followed by Dr. Janey Rubio. His prostate MRI from 6/22/22 showed 2 PIRADS 4 lesions in the left apex and left base. No evidence of EPE. He then underwent a fusion guided biopsy that showed [place] 3 + 4 = 7 grade adenocarcinoma. He has no known family history of prostate cancer. He denies any bothersome urinary symptoms. He does have some degree of slow stream, however he is able to adequately empty his bladder if he sits down to void. He has no hx of abdominal surgery. He has a small umbilical hernia.     38/94/3738 TRUS BIOPSY RESULTS:  Right Lateral Base:  Benign  Right Medial Base:  3+3=6 (<5%)  Right Lateral Mid:  Benign  Right Medial Mid:  Benign  Right Lateral [place]:  ASAP  Right Medial [place]:  PIN  Left Lateral Base:  3+4=7 (20%)  Left Medial Base:  3+4=7 (10%)  Left Lateral Mid:  ASAP  Left Medial Mid:   PIN  Left Lateral [place]:  Benign  Left Medial [place]:  3+3=6 (<5%)  MRI/US FUSION - TARGET #1 - LEFT APEX PZ:   M. Left [place] PZ, Atypical glands suspicious for but not diagnostic of malignancy. MRI/US FUSION - TARGET #2 - LEFT BASE PZ:   N. Left Base PZ, PROSTATIC ADENOCARCINOMA (TANYA SCORE 3+3=6; GRADE GROUP 1)     INVOLVING 5% OF 1 OF 3 CORES. PAST MEDICAL HISTORY:    Allergies: BACTRIM (SULFAMETHOXAZOLE-TRIMETHOPRIM) (Mild)  DENIES: Latex, Shellfish, X-Ray Dye, Iodine. Medications: * VITAMIN C TABLET once a day   * ASPIRIN 81 81 MG ORAL TABLET DELAYED RELEASE once a day   multivitamin tablet (multivitamin) once a day     Problems: ADENOCARCINOMA, PROSTATE (ICD-185) ([id number])  Examination, radiological, GABRIELA (ICD-V72.5) (ZRB37-S84.89)  Prostatic intraepithelial neoplasia (ICD-602.3) (BJD18-J31.31)  BPH with obstruction/LUTS (ICD-600.01) (MRA24-J70.1)  Elevated PSA (ICD-790.93) (BYV78-V30.20)    Illnesses: None. DENIES: Heart Disease, Pacemaker/Defibrillator, Lung Disease, Diabetes, High Blood Pressure, Bowel Problems, Stroke/Seizure, Kidney Problems, Bleeding Problems, HIV, Hepatitis, or Cancer. Surgeries: None. Family History: Kidney Stones. DENIES: Prostate cancer, Kidney cancer, Kidney disease. Social History: Retired - Body Tech. . Smoking status: never smoker. Does not drink alcohol. System Review: DENIES: Unexplained Weight Loss, Dry Eyes, Dry Mouth, Leg Swelling, Shortness of Breath, Constipation, Involuntary Urine Loss, Lower Extremity Weakness, Dry Skin, Difficulty Walking, Psychiatric Problems, Impaired Sex Drive, Easy Bleeding, Rash. EXAMINATION: Appearance: well-developed NAD Respiratory Effort: breathing easily Abdomen/Flank: soft non-tender without masses; no CVA tenderness Hernia: small umbilical hernia     DIAGNOSTIC STUDIES:  06-22-22 MRI - PIRADS 4  06-06-19  PIN/ASAP on prostate biopsy    URINALYSIS  Urine Micro not done    PSA HISTORY  6.91 ng/ml on 04/06/2022  6.66 ng/ml on 01/05/2022  5.95 ng/ml on 01/06/2021    IMPRESSION:    1. ADENOCARCINOMA - PROSTATE (MRK41-N55): The risks of a robotic assisted laparoscopic prostatectomy with bilateral pelvic lymph node dissection including but not limited to, bleeding, possibility of blood transfusion, infection, injury to contiguous structures including the rectum, lymphedema, PE, DVT, MI, incontinence, erectile dysfunction and recurrence of the cancer with possibilities of adjuvant treatment were discussed in detail with the patient. The patient understands and wishes to proceed. Also we discussed the possibility of conversion. I offered a consultation with radiation oncology, however the patient declines. He would like to proceed RALP. We will plan to repair his umbilical hernia at the time of his procedure. 2. BPH WITH OBSTRUCTION/LUTS (CTZ85-O87.1) - Unchanged    3.  ELEVATED PSA (ZUS36-F72.20) - Unchanged    PLAN: All questions and concerns were addressed prior to the patient leaving the clinic. The patient understands and agrees with the plan.

## 2022-10-12 NOTE — PROGRESS NOTES
TRANSFER - IN REPORT:    Verbal report received from Casie Alexander RN(name) on Ricci Ewing  being received from [facility]) for routine post - op      Report consisted of patients Situation, Background, Assessment and   Recommendations(SBAR). Information from the following report(s) SBAR, Kardex, Procedure Summary, Intake/Output, Recent Results, Med Rec Status, and Cardiac Rhythm NS  was reviewed with the receiving nurse. Opportunity for questions and clarification was provided. Assessment completed upon patients arrival to unit and care assumed.

## 2022-10-12 NOTE — PROGRESS NOTES
10/12/22 0931   Family Communication   Family Update Message Procedure started   Delivery Origin Nurse    Relationship to Patient Spouse    Phone Number [phone]   Family/Significant Other Update Called

## 2022-10-12 NOTE — PERIOP NOTES
TRANSFER - OUT REPORT:    Verbal report given to Tammie Rivera RN(name) on Michael Longoria  being transferred to [facility](unit) for routine post - op       Report consisted of patients Situation, Background, Assessment and   Recommendations(SBAR). Information from the following report(s) OR Summary, Procedure Summary, Intake/Output, and MAR was reviewed with the receiving nurse. Opportunity for questions and clarification was provided.       Patient transported with:   O2 @ 2 liters  Tech

## 2022-10-12 NOTE — ANESTHESIA PREPROCEDURE EVALUATION
Relevant Problems   No relevant active problems       Anesthetic History   No history of anesthetic complications            Review of Systems / Medical History  Patient summary reviewed, nursing notes reviewed and pertinent labs reviewed    Pulmonary          Smoker      Comments: Smoking Status: Former   Neuro/Psych              Cardiovascular  Within defined limits                Exercise tolerance: >4 METS     GI/Hepatic/Renal                Endo/Other        Obesity, arthritis and cancer     Other Findings   Comments: Prostate ca           Physical Exam    Airway  Mallampati: III    Neck ROM: normal range of motion   Mouth opening: Normal     Cardiovascular  Regular rate and rhythm,  S1 and S2 normal,  no murmur, click, rub, or gallop             Dental    Dentition: Caps/crowns     Pulmonary  Breath sounds clear to auscultation               Abdominal  GI exam deferred       Other Findings            Anesthetic Plan    ASA: 2  Anesthesia type: general    Monitoring Plan: BIS      Induction: Intravenous  Anesthetic plan and risks discussed with: Patient

## 2022-10-12 NOTE — PERIOP NOTES
Floseal Hemostatic Matrix 10mL Reference: [id number] Lot number: [id number] Expiration date: 2024/08/13

## 2022-10-12 NOTE — PERIOP NOTES
3581 - PT DENIES FEVER, COLD, COUGH, SOB, N/V, DIARRHEA. ... PRE-OP TCHING DONE - PT VERBALIZES UNDERSTANDING. STRETCHER IN LOWEST POSITION, CB IN PLACE AND SR UP X2.

## 2022-10-12 NOTE — PERIOP NOTES
Handoff Report from Operating Room to PACU    Report received from WILMA Bose MD and Vance Nielson RN regarding Ileana Estrada. Surgeon(s):  Doreen Meadows MD  And Procedure(s) (LRB):  ROBOTIC ASSISTED LAPAROSCOPIC PROSTATECTOMY, BILATERAL PELVIC LYMPH NODE DISSECTION, UMBILICAL HERNIA REPAIR (N/A)  confirmed   with allergies and dressings discussed. Anesthesia type, drugs, patient history, complications, estimated blood loss, vital signs, intake and output, and last pain medication, lines, reversal medications, and temperature were reviewed.

## 2022-10-12 NOTE — ANESTHESIA POSTPROCEDURE EVALUATION
Procedure(s):  ROBOTIC ASSISTED LAPAROSCOPIC PROSTATECTOMY, BILATERAL PELVIC LYMPH NODE DISSECTION, UMBILICAL HERNIA REPAIR. general    Anesthesia Post Evaluation        Patient location during evaluation: PACU  Note status: Adequate. Level of consciousness: responsive to verbal stimuli and sleepy but conscious  Pain management: satisfactory to patient  Airway patency: patent  Anesthetic complications: no  Cardiovascular status: acceptable  Respiratory status: acceptable  Hydration status: acceptable  Comments: +Post-Anesthesia Evaluation and Assessment    Patient: Carson Santana MRN: [id number]  SSN: [id number]   YOB: 1950  Age: 73 y.o. Sex: male      Cardiovascular Function/Vital Signs    /66   Pulse 68   Temp 36.5 °C (97.7 °F)   Resp 18   Ht 5' 7.5\" (1.715 m)   Wt 85 kg (187 lb 6.3 oz)   SpO2 94%   BMI 28.92 kg/m²     Patient is status post Procedure(s) with comments:  ROBOTIC ASSISTED LAPAROSCOPIC PROSTATECTOMY, BILATERAL PELVIC LYMPH NODE DISSECTION, UMBILICAL HERNIA REPAIR - ( E R A S). Nausea/Vomiting: Controlled. Postoperative hydration reviewed and adequate. Pain:  Pain Scale 1: Visual (10/12/22 1400)  Pain Intensity 1: 3 (10/12/22 1345)   Managed. Neurological Status:   Neuro (WDL): Exceptions to WDL (10/12/22 1215)   At baseline. Mental Status and Level of Consciousness: Arousable. Pulmonary Status:   O2 Device: Nasal cannula (10/12/22 1215)   Adequate oxygenation and airway patent. Complications related to anesthesia: None    Post-anesthesia assessment completed. No concerns. Signed By: Thais Rocha MD    10/12/2022  Post anesthesia nausea and vomiting:  controlled      INITIAL Post-op Vital signs:   Vitals Value Taken Time   /66 10/12/22 1400   Temp 36.5 °C (97.7 °F) 10/12/22 1215   Pulse 73 10/12/22 1413   Resp 21 10/12/22 1413   SpO2 97 % 10/12/22 1413   Vitals shown include unvalidated device data.

## 2022-10-13 LAB
ANION GAP SERPL CALC-SCNC: 4 MMOL/L (ref 5–15)
BUN SERPL-MCNC: 17 MG/DL (ref 6–20)
BUN/CREAT SERPL: 16 (ref 12–20)
CALCIUM SERPL-MCNC: 7.5 MG/DL (ref 8.5–10.1)
CHLORIDE SERPL-SCNC: 108 MMOL/L (ref 97–108)
CO2 SERPL-SCNC: 26 MMOL/L (ref 21–32)
CREAT SERPL-MCNC: 1.09 MG/DL (ref 0.7–1.3)
GLUCOSE SERPL-MCNC: 134 MG/DL (ref 65–100)
HCT VFR BLD AUTO: 34.8 % (ref 36.6–50.3)
HGB BLD-MCNC: 11.6 G/DL (ref 12.1–17)
POTASSIUM SERPL-SCNC: 4.4 MMOL/L (ref 3.5–5.1)
SODIUM SERPL-SCNC: 138 MMOL/L (ref 136–145)

## 2022-10-13 PROCEDURE — 74011250637 HC RX REV CODE- 250/637: Performed by: UROLOGY

## 2022-10-13 PROCEDURE — 80048 BASIC METABOLIC PNL TOTAL CA: CPT

## 2022-10-13 PROCEDURE — 65270000029 HC RM PRIVATE

## 2022-10-13 PROCEDURE — 85018 HEMOGLOBIN: CPT

## 2022-10-13 PROCEDURE — 74011250636 HC RX REV CODE- 250/636: Performed by: UROLOGY

## 2022-10-13 PROCEDURE — 74011000250 HC RX REV CODE- 250: Performed by: UROLOGY

## 2022-10-13 PROCEDURE — 36415 COLL VENOUS BLD VENIPUNCTURE: CPT

## 2022-10-13 PROCEDURE — 74011000258 HC RX REV CODE- 258: Performed by: UROLOGY

## 2022-10-13 PROCEDURE — 74011250636 HC RX REV CODE- 250/636: Performed by: ANESTHESIOLOGY

## 2022-10-13 RX ADMIN — OXYCODONE 5 MG: 5 TABLET ORAL at 09:31

## 2022-10-13 RX ADMIN — DOCUSATE SODIUM 100 MG: 100 CAPSULE ORAL at 09:31

## 2022-10-13 RX ADMIN — SODIUM CHLORIDE, PRESERVATIVE FREE 10 ML: 5 INJECTION INTRAVENOUS at 06:05

## 2022-10-13 RX ADMIN — OXYCODONE 5 MG: 5 TABLET ORAL at 20:49

## 2022-10-13 RX ADMIN — SODIUM CHLORIDE, PRESERVATIVE FREE 10 ML: 5 INJECTION INTRAVENOUS at 22:02

## 2022-10-13 RX ADMIN — KETOROLAC TROMETHAMINE 15 MG: 30 INJECTION, SOLUTION INTRAMUSCULAR at 03:41

## 2022-10-13 RX ADMIN — FENTANYL CITRATE 25 MCG: 50 INJECTION, SOLUTION INTRAMUSCULAR; INTRAVENOUS at 06:26

## 2022-10-13 RX ADMIN — DOCUSATE SODIUM 100 MG: 100 CAPSULE ORAL at 18:04

## 2022-10-13 RX ADMIN — OXYCODONE 5 MG: 5 TABLET ORAL at 16:30

## 2022-10-13 RX ADMIN — CEFAZOLIN 1 G: 1 INJECTION, POWDER, FOR SOLUTION INTRAMUSCULAR; INTRAVENOUS at 09:31

## 2022-10-13 RX ADMIN — DEXTROSE AND SODIUM CHLORIDE 1000 ML: 5; 900 INJECTION, SOLUTION INTRAVENOUS at 02:12

## 2022-10-13 NOTE — PROGRESS NOTES
End of Shift Note    Bedside shift change report given to Kiesha (oncoming nurse) by Lloyd Rosas RN (offgoing nurse). Report included the following information SBAR, Kardex, OR Summary, Procedure Summary, Intake/Output, MAR, and Recent Results    Shift worked:  7 am - 7 p,      Shift summary and any significant changes:     Increased mobility, pain management, plan of care      Concerns for physician to address:  none     [facility]   [phone]       Activity:  Activity Level: Up with Assistance  Number times ambulated in hallways past shift: 1  Number of times OOB to chair past shift: sat out for most of today     Cardiac:   Cardiac Monitoring: No      Cardiac Rhythm: Sinus Rhythm    Access:  Current line(s): PIV     Genitourinary:   Urinary status: cedeno    Respiratory:   O2 Device: None (Room air)  Chronic home O2 use?: NO  Incentive spirometer at bedside: YES       GI:  Last Bowel Movement Date: 10/12/22  Current diet:  ADULT DIET Full Liquid  Passing flatus: YES  Tolerating current diet: YES       Pain Management:   Patient states pain is manageable on current regimen: YES    Skin:  Robert Score: 20  Interventions: increase time out of bed    Patient Safety:  Fall Score:  Total Score: 2  Interventions: assistive device (walker, cane, etc) and gripper socks  High Fall Risk: Yes    Length of Stay:  Expected LOS: 1d 9h  Actual LOS: 1      Lloyd Rosas RN

## 2022-10-13 NOTE — PROGRESS NOTES
Progress Note    Patient: Alfonso Santillan MRN: [id number]  SSN: [id number]    YOB: 1950  Age: 73 y.o. Sex: male          ADMITTED:  10/12/2022 to Nicki Villareal MD  for Prostate cancer [facility]) Mercedez Burnett is 1 Day Post-Op Procedure(s) with comments:  ROBOTIC ASSISTED LAPAROSCOPIC PROSTATECTOMY, BILATERAL PELVIC LYMPH NODE DISSECTION, UMBILICAL HERNIA REPAIR - ( E R A S). He is doing well. He has mild pain. He has mild nausea. He is tolerating clear liquids. Indwelling catheter is draining well. No gas yet, some burping. He ambulated yesterday, plans to continue ambulation today. Tolerating liquids. Vitals:  Temp (24hrs), Av.7 °F (36.5 °C), Min:97.2 °F (36.2 °C), Max:98.1 °F (36.7 °C)     Blood pressure 128/77, pulse 91, temperature 98 °F (36.7 °C), resp. rate 20, height 5' 7.5\" (1.715 m), weight 85 kg (187 lb 6.3 oz), SpO2 100 %. I&O's:  10/11 1901 - 10/13 0700  In: 7159 [I.V.:1420]  Out: 2500 [Urine:2350]   No intake/output data recorded. Exam:   abdomen soft, appropriately TTP at surgical sites. All incisions clean/dry/intact without evidence of infection. Cedeno with clear urine output.      Labs:   Recent Labs     10/13/22  0156   HGB 11.6*   HCT 34.8*     Recent Labs     10/13/22  0156      K 4.4      CO2 26   *   BUN 17   CREA 1.09   CA 7.5*        Cultures:        Imaging:       Assessment:     - 1 Day Post-Op Procedure(s) with comments:  ROBOTIC ASSISTED LAPAROSCOPIC PROSTATECTOMY, BILATERAL PELVIC LYMPH NODE DISSECTION, UMBILICAL HERNIA REPAIR - ( E R A S)    Active Problems:    Prostate cancer ([address] (10/12/2022)        Plan:     - encourage ambulation, OOB for meals and ISB use  -will slowly advance diet, okay for full liquid diet  -hep lock IVF  -possible discharge home this evening, will re-evaluate midday   -pt will d.c home with wilian, nursing to provide cedeno teaching -pt aware of follow up next week to discuss pathology     ++pt wanting to stay tonight and consider discharge in am. Will round again tomorrow morning.      Supervising Dr. Armida Spear  Signed By: John Castellanos NP - October 13, 2022

## 2022-10-13 NOTE — PROGRESS NOTES
End of Shift Note    Bedside shift change report given to Amelia Wray RN (oncoming nurse) by Enriqueta Alberts (offgoing nurse). Report included the following information SBAR, Kardex, OR Summary, Procedure Summary, Intake/Output, MAR, and Recent Results    Shift worked:  7a-7p     Shift summary and any significant changes:     Patient transferred to floor from PACU, report from [address] [place] [address] at 2273. All VS completed and were WNL. Patient titrated O2 from 2L to RA, baseline and tolerating. Medicated for nausea and vomiting x 1 post dinner tray. Tolerated everything with clear liquid, except his beef broth. PRN Chasity for pain x 1 this evening. IS at bedside, patient independent with use. Cedeno patient and draining red urine with sediment. Admission assessment and databases completed by RN. Concerns for physician to address:       Zone phone for oncoming shift:          Activity:  Activity Level: Up with Assistance  Number times ambulated in hallways past shift: 0  Number of times OOB to chair past shift: 0    Cardiac:   Cardiac Monitoring: No      Cardiac Rhythm: Sinus Rhythm    Access:  Current line(s): PIV     Genitourinary:   Urinary status: cedeno    Respiratory:   O2 Device: None (Room air)  Chronic home O2 use?: NO  Incentive spirometer at bedside: YES       GI:  Last Bowel Movement Date: 10/12/22  Current diet:  ADULT DIET Clear Liquid  Passing flatus: NO  Tolerating current diet: NO       Pain Management:   Patient states pain is manageable on current regimen: YES    Skin:  Robert Score: 20  Interventions: increase time out of bed    Patient Safety:  Fall Score:  Total Score: 1  Interventions: gripper socks  High Fall Risk: Yes    Length of Stay:  Expected LOS: - - -  Actual LOS: 0      Enriqueta Alberts

## 2022-10-13 NOTE — PROGRESS NOTES
Problem: Falls - Risk of  Goal: *Absence of Falls  Description: Document Tonia Ground Fall Risk and appropriate interventions in the flowsheet.   Outcome: Progressing Towards Goal  Note: Fall Risk Interventions:            Medication Interventions: Patient to call before getting OOB                   Problem: Patient Education: Go to Patient Education Activity  Goal: Patient/Family Education  Outcome: Progressing Towards Goal     Problem: Pain  Goal: *Control of Pain  Outcome: Progressing Towards Goal  Goal: *PALLIATIVE CARE:  Alleviation of Pain  Outcome: Progressing Towards Goal

## 2022-10-13 NOTE — PROGRESS NOTES
End of Shift Note    Bedside shift change report given to [address] [place] [address] (oncoming nurse) by Earl Roberts RN (offgoing nurse). Report included the following information SBAR, Kardex, MAR, and Recent Results    Shift worked:  7p-7a     Shift summary and any significant changes:     Medicated x 2 no distress noted . Pt walked out into the hallway x 1 stand by assist. Medicated x 2 tolerated well. Presently HOB up call bell in reach. Concerns for physician to address:  none     Zone phone for oncoming shift:   [phone]       Activity:  Activity Level: Up with Assistance (stand by)  Number times ambulated in hallways past shift: 1  Number of times OOB to chair past shift: 0    Cardiac:   Cardiac Monitoring: No      Cardiac Rhythm: Sinus Rhythm    Access:  Current line(s): PIV     Genitourinary:   Urinary status: cedeno    Respiratory:   O2 Device: None (Room air)  Chronic home O2 use?: NO  Incentive spirometer at bedside: YES       GI:  Last Bowel Movement Date: 10/12/22  Current diet:  ADULT DIET Clear Liquid  Passing flatus: NO  Tolerating current diet: YES       Pain Management:   Patient states pain is manageable on current regimen: YES    Skin:  Robert Score: 20  Interventions: float heels, increase time out of bed, and foam dressing    Patient Safety:  Fall Score:  Total Score: 1  Interventions: assistive device (walker, cane, etc), gripper socks, and pt to call before getting OOB  High Fall Risk: Yes    Length of Stay:  Expected LOS: - - -  Actual LOS: 1      Earl Roberts RN

## 2022-10-13 NOTE — OP NOTES
[address]  OPERATIVE REPORT    Name:  Gagan Odom  MR#:  [id number]  :  1950  ACCOUNT #:  [de-identified]  DATE OF SERVICE:  10/12/2022    PREOPERATIVE DIAGNOSIS:  Localized adenocarcinoma of the prostate. POSTOPERATIVE DIAGNOSIS:  Localized adenocarcinoma of the prostate. PROCEDURE PERFORMED:  Robotic-assisted laparoscopic prostatectomy and bilateral pelvic lymph node dissection. SURGEON:  Luann Schmitz MD    ASSISTANT:  Arnold Pantoja. ANESTHESIA:  General.    COMPLICATIONS:  None. SPECIMENS REMOVED:  Prostate, bilateral pelvic lymph nodes. IMPLANTS:  none. ESTIMATED BLOOD LOSS:  150 mL. FINDINGS:  No evidence of extraprostatic disease. PROCEDURE:  After obtaining informed consent, the patient received preoperative antibiotics, followed ERAS protocol and SCDs and TEDs were placed in lower extremities. He also received 5000 units of subcutaneous heparin for DVT prophylaxis. After adequate injection of general anesthetic, he was placed in the low lithotomy position. All pressure points were carefully padded. The abdomen was shaved and a red rubber was placed in the rectum, affixed to a 60 mL syringe filled with air. The abdomen, penis and scrotum were prepped and draped in sterile fashion. Full time-out was accomplished. A Veress needle technique was used to insufflate the abdomen to 15 mmHg of pressure. We took a look in and there was no evidence of any injury to the abdominal contents using the Veress needle technique and there was no evidence of any spread of disease. The following ports were then placed under direct visualization after infiltrating the skin and peritoneum with Marcaine-lidocaine mix, right lower quadrant, fourth arm port and right arm port, left lower quadrant, left arm port and the AirSeal port, left lower quadrant with the 5-mm port just superior to umbilicus was the camera port.   These all entered the abdomen and were atraumatic and hemostatic as they entered. We then placed the patient steep Trendelenburg and docked the robot. I took down sharply some of the sigmoid adhesions left lower quadrant which gave me exposure into the peritoneal reflection between the rectum and bladder. I was able to incise this and dissect down to the ampulla of vas and seminal vesicles. They were very inflamed and stuck and I had to just be very careful sharp dissection. So, I was able to free them up and transect both ampulla of vas. There were tented anteriorly. Once again the posterior plane was very inflamed, but due to the meticulous dissection, I was able to drop the rectum down and get a nice fatty plane between the rectum and the bladder. Hemostasis was achieved and visual inspection revealed no injury to the rectum. I then went anterior, took down the space of [place] by incising the medial umbilical ligaments, cutting the areolar tissue and exposing the anterior surface of the prostate and bladder. The superficial dorsal vein was cauterized, transected and fat was cleansed off. The anterior surface of prostate was removed from the body. A bilateral pelvic lymph node dissection was accomplished using following boundaries; distally, node of [place]; proximally, bifurcation; laterally, the pelvic sidewall; anteriorly, the external iliac vein; and posteriorly, the obturator nerve. Cautery was used to control perforating lymphatics and bleeders. The right pelvic lymph nodes were somewhat friable and came apart easy during the dissection. The left appeared to be normal.  They both sent for permanent sectioning and hemostasis was achieved in both obturator fossa. Both obturator nerves were identified and unharmed and Surgicel was placed in the obturator fossa. I then incised the endopelvic fascia bilaterally, sweeping levator fibers laterally and external sphincteric fibers distally.   The dorsal venous complex was then transected using the endovascular stapler. Then switched from 0-degree to 30-degree down lens, tugged on the Suggs to see where the balloon ended and I was able to transect the bladder neck anteriorly and enter the bladder. Please note a Suggs catheter 18-Amharic was placed at the beginning the case under sterile conditions. I then grasped the Suggs, pulled into the abdomen and was able to see the ureteral orifices nice and posterior. I then completed the posterior transection of bladder neck and then continued the posterior dissection using cautery until I entered the previous dissected space with the ampulla of vas and seminal vesicles. These were tented anteriorly. I then took down the remaining pedicle attached to the bladder to the prostate using the VasoSeal.  I then switched back to 0-degree  lens and able to get up underneath the prostate and did a very careful meticulous sharp dissection and I dropped the rectum down nicely which left nice pedicles which were taken with the VasoSeal and then athermic dissection was used to drop down the neurovascular bundles. The prostatic notch was identified, transected, the urethra pulled back and the posterior urethra and rectourethralis were transected and the specimens placed in an Endo Catch bag. We turned the pneumo down to 5-mm pressure. Hemostasis was obtained with bipolar and pinpoint cautery preserving neurovascular bundles. Then we insufflated the rectum after placing fluid into the pelvis. It distended nicely and there was no evidence of any bubbles indicating no injury to the rectum. We then placed FloSeal on both neurovascular bundles and pedicles of prostate. We turned the pneumo back up to 15-mm of pressure and created urethrovesical anastomosis over a 20-Amharic two-way Suggs with 10 mL of water balloon. This was done with a double-armed 3-0 V-Loc suture. It was tied down in a watertight fashion. Both needles removed from the body.   We filled the bladder with 100 mL saline. It was completely watertight, no extravasation was noted therefore no drain was left. We then desufflated the abdomen and removed all the ports under direct visualization and they were hemostatic. The supraumbilical incision was enlarged and the prostate removed with an Endo Catch bag and sent to pathology. We then noted that he did have a umbilical hernia, so we essentially repaired the umbilical hernia with interrupted #1 PDS suture as it was essentially part of the closure. We then completed closure of the fascia and infiltrated all incisions again with Marcaine-lidocaine mix, close this skin with 4-0 Monocryl. Dermabond was applied. The patient tolerated the procedure well. There were no complications.       MD ZEKE [id number]  D:  10/12/2022 12:05  T:  JOB #:  [id number]  CC:  Inessa Adkins MD       [facility]

## 2022-10-14 PROCEDURE — 74011250636 HC RX REV CODE- 250/636: Performed by: NURSE PRACTITIONER

## 2022-10-14 PROCEDURE — 74011000258 HC RX REV CODE- 258: Performed by: NURSE PRACTITIONER

## 2022-10-14 PROCEDURE — 65270000029 HC RM PRIVATE

## 2022-10-14 PROCEDURE — 74011000250 HC RX REV CODE- 250: Performed by: UROLOGY

## 2022-10-14 PROCEDURE — 94760 N-INVAS EAR/PLS OXIMETRY 1: CPT

## 2022-10-14 PROCEDURE — 74011250636 HC RX REV CODE- 250/636: Performed by: UROLOGY

## 2022-10-14 PROCEDURE — 74011250637 HC RX REV CODE- 250/637: Performed by: UROLOGY

## 2022-10-14 RX ORDER — MORPHINE SULFATE 2 MG/ML
2 INJECTION, SOLUTION INTRAMUSCULAR; INTRAVENOUS
Status: DISCONTINUED | OUTPATIENT
Start: 2022-10-14 | End: 2022-10-18 | Stop reason: HOSPADM

## 2022-10-14 RX ORDER — DEXTROSE MONOHYDRATE AND SODIUM CHLORIDE 5; .9 G/100ML; G/100ML
50 INJECTION, SOLUTION INTRAVENOUS CONTINUOUS
Status: DISCONTINUED | OUTPATIENT
Start: 2022-10-14 | End: 2022-10-17

## 2022-10-14 RX ADMIN — OXYCODONE 5 MG: 5 TABLET ORAL at 08:25

## 2022-10-14 RX ADMIN — DEXTROSE AND SODIUM CHLORIDE 75 ML/HR: 5; 900 INJECTION, SOLUTION INTRAVENOUS at 12:40

## 2022-10-14 RX ADMIN — SODIUM CHLORIDE, PRESERVATIVE FREE 10 ML: 5 INJECTION INTRAVENOUS at 23:06

## 2022-10-14 RX ADMIN — DOCUSATE SODIUM 100 MG: 100 CAPSULE ORAL at 08:25

## 2022-10-14 RX ADMIN — SODIUM CHLORIDE, PRESERVATIVE FREE 10 ML: 5 INJECTION INTRAVENOUS at 05:59

## 2022-10-14 RX ADMIN — OXYCODONE 5 MG: 5 TABLET ORAL at 01:21

## 2022-10-14 RX ADMIN — ONDANSETRON 4 MG: 2 INJECTION INTRAMUSCULAR; INTRAVENOUS at 11:14

## 2022-10-14 RX ADMIN — MORPHINE SULFATE 2 MG: 2 INJECTION, SOLUTION INTRAMUSCULAR; INTRAVENOUS at 16:32

## 2022-10-14 RX ADMIN — MORPHINE SULFATE 2 MG: 2 INJECTION, SOLUTION INTRAMUSCULAR; INTRAVENOUS at 23:54

## 2022-10-14 RX ADMIN — SODIUM CHLORIDE, PRESERVATIVE FREE 10 ML: 5 INJECTION INTRAVENOUS at 12:44

## 2022-10-14 RX ADMIN — SODIUM CHLORIDE, PRESERVATIVE FREE 10 ML: 5 INJECTION INTRAVENOUS at 16:32

## 2022-10-14 RX ADMIN — MORPHINE SULFATE 2 MG: 2 INJECTION, SOLUTION INTRAMUSCULAR; INTRAVENOUS at 19:32

## 2022-10-14 NOTE — PROGRESS NOTES
End of Shift Note    Bedside shift change report given to JAMAL Narayan (oncoming nurse) by Diane Matthews RN (offgoing nurse). Report included the following information SBAR, Kardex, OR Summary, Procedure Summary, Intake/Output, MAR, and Recent Results    Shift worked:  7 am - 7 p,      Shift summary and any significant changes:     Discharge was canceled today due to increased abd pain and some nausea. Pain meds changed to Morphine IV and pt is now NPO. Concerns for physician to address:  none     Zone phone for oncoming shift:   [phone]       Activity:  Activity Level: Up with Assistance  Number times ambulated in hallways past shift: 1 with walker  Number of times OOB to chair past shift: 2, most of morning. Cardiac:   Cardiac Monitoring: No      Cardiac Rhythm: Sinus Rhythm    Access:  Current line(s): PIV     Genitourinary:   Urinary status: cedeno    Respiratory:   O2 Device: None (Room air)  Chronic home O2 use?: NO  Incentive spirometer at bedside: YES       GI:  Last Bowel Movement Date: 10/12/22  Current diet:  DIET NPO  Passing flatus: YES  Tolerating current diet: YES       Pain Management:   Patient states pain is manageable on current regimen: YES    Skin:  Robert Score: 20  Interventions: increase time out of bed    Patient Safety:  Fall Score:  Total Score: 3  Interventions: assistive device (walker, cane, etc) and gripper socks  High Fall Risk: Yes    Length of Stay:  Expected LOS: 1d 9h  Actual LOS: 2025 [place], RN

## 2022-10-14 NOTE — PROGRESS NOTES
Hospital follow-up PCP transitional care appointment has been scheduled with Dr. Caroline Quiroz. Aldo Scheuermann on 10/21/22 at 1020. Pending patient discharge.   Pawel Maldonado, Care Management Assistant

## 2022-10-14 NOTE — PROGRESS NOTES
Problem: Falls - Risk of  Goal: *Absence of Falls  Description: Document Fabian Benedict Fall Risk and appropriate interventions in the flowsheet.   Outcome: Resolved/Met     Problem: Patient Education: Go to Patient Education Activity  Goal: Patient/Family Education  Outcome: Resolved/Met     Problem: Pain  Goal: *Control of Pain  Outcome: Resolved/Met  Goal: *PALLIATIVE CARE:  Alleviation of Pain  Outcome: Resolved/Met

## 2022-10-14 NOTE — PROGRESS NOTES
4049 Message sent to NP for Urology re: Only has short acting fentanyl for IV pain medicine, I don't want to give him the oral roxicodone due to his NPO status, he says he is a 10/10 abdpain/cramping. Can you change it to Morphine or dilaudid that adi last a little longer. Abd distended but semi-soft with hypoactive bowel sounds positive. No nausea at present. [phone] Call placed to Urology office answering service at [phone] re:  Above as there was no answer on perfect serve.

## 2022-10-14 NOTE — PROGRESS NOTES
Progress Note    Patient: Ray Giron MRN: [id number]  SSN: [id number]    YOB: 1950  Age: 73 y.o. Sex: male          ADMITTED:  10/12/2022 to Chalo Gregorio MD  for Prostate cancer [facility] Brandyn Sr is 2 Days Post-Op Procedure(s) with comments:  ROBOTIC ASSISTED LAPAROSCOPIC PROSTATECTOMY, BILATERAL PELVIC LYMPH NODE DISSECTION, UMBILICAL HERNIA REPAIR - ( E R A S). He is doing well. He has moderate pain. He has no nausea. He is tolerating full liquids. Indwelling catheter is draining well. Some gas pain, no flatus or BM yet. Vss. Wanting to go home. Cedeno with Good UOP, clear yellow        Vitals:  Temp (24hrs), Av.3 °F (36.8 °C), Min:98 °F (36.7 °C), Max:98.5 °F (36.9 °C)     Blood pressure 133/84, pulse 88, temperature 98.5 °F (36.9 °C), resp. rate 16, height 5' 7.5\" (1.715 m), weight 85 kg (187 lb 6.3 oz), SpO2 94 %. I&O's:  10/12 1901 - 10/14 0700  In: 360 [P.O.:360]  Out: 4250 [Urine:4250]   No intake/output data recorded. Exam:   abdomen soft, appropriately TTP at surgical sites. All incisions clean/dry/intact without evidence of infection. Cedeno with clear urine output. Labs:   Recent Labs     10/13/22  0156   HGB 11.6*   HCT 34.8*     Recent Labs     10/13/22  0156      K 4.4      CO2 26   *   BUN 17   CREA 1.09   CA 7.5*        Cultures:        Imaging:       Assessment:     - 2 Days Post-Op Procedure(s) with comments:  ROBOTIC ASSISTED LAPAROSCOPIC PROSTATECTOMY, BILATERAL PELVIC LYMPH NODE DISSECTION, UMBILICAL HERNIA REPAIR - ( E R A S)    Active Problems:    Prostate cancer ([address] (10/12/2022)        Plan:     - plan for midday discharge  -ambulation this morning, encourage hydration   -homoe with cedeno, nursing to provide teaching   -pt aware of follow up next week with Dr. Katy Piper to discuss pathology     ++worsening nausea midday with meals. No vomiting.  Pain less controlled. Abd still soft, +distended. Plan to keep pt another night, keep npo, will resume IVF and check morning labs. Urology to see him in AM and hopeful discharge tomorrow if improved.  Spoke with nursing about plan     Supervising MD, Dr. Pamela Tirado By: Jude Morse, DOMINGO - October 14, 2022

## 2022-10-14 NOTE — PROGRESS NOTES
End of Shift Note    Bedside shift change report given to Niru Espino (oncoming nurse) by Canelo Vargas (offgoing nurse). Report included the following information SBAR, Kardex, Intake/Output, MAR, and Recent Results    Shift worked:  5813-4971       Shift summary and any significant changes:     Patient is tolerating all aspects of care. Medicated for moderate pain as needed. Cedeno is draining chuyita urine. Patient is up in recliner. Concerns for physician to address:         Zone phone for oncoming shift:     [phone]       Activity:  Activity Level: Up with Assistance  Number times ambulated in hallways past shift: 0  Number of times OOB to chair past shift: 0    Cardiac:   Cardiac Monitoring: No      Cardiac Rhythm: Sinus Rhythm    Access:  Current line(s): PIV     Genitourinary:   Urinary status: cedeno    Respiratory:   O2 Device: None (Room air)  Chronic home O2 use?: NO  Incentive spirometer at bedside: YES       GI:  Last Bowel Movement Date: 10/12/22  Current diet:  ADULT DIET Full Liquid  Passing flatus: NO  Tolerating current diet: YES       Pain Management:   Patient states pain is manageable on current regimen: YES    Skin:  Robert Score: 20  Interventions: increase time out of bed, PT/OT consult, and nutritional support     Patient Safety:  Fall Score:  Total Score: 2  Interventions: bed/chair alarm, assistive device (walker, cane, etc), gripper socks, and pt to call before getting OOB  High Fall Risk: Yes    Length of Stay:  Expected LOS: 1d 9h  Actual LOS: 2      [facility]

## 2022-10-14 NOTE — DISCHARGE SUMMARY
Subjective


Post Partum Day:  1


Remarks


breastfeeding, taking percocet and motrin for pain





Objective


Vitals/I&O





Vital Signs








  Date Time  Temp Pulse Resp B/P (MAP) Pulse Ox O2 Delivery O2 Flow Rate FiO2


 


3/13/18 20:45 98.6 72 18 119/65 (83)    


 


3/13/18 19:16    118/86 (97)    


 


3/13/18 19:15 98.0  18     


 


3/13/18 19:00  62  106/74 (85)    


 


3/13/18 18:46  92  102/79 (87)    


 


3/13/18 18:31  114  106/67 (80)    


 


3/13/18 18:16  69  112/63 (79)    


 


3/13/18 18:15  77 18     


 


3/13/18 18:01    112/79 (90)    


 


3/13/18 18:00   18     


 


3/13/18 17:46  77  96/63 (74)    


 


3/13/18 17:30  117  116/63 (80)    


 


3/13/18 17:15   18     


 


3/13/18 17:15  85  123/69 (87)    


 


3/13/18 17:01  76  119/44 (69)    


 


3/13/18 16:58   18     


 


3/13/18 16:56 98.0       


 


3/13/18 16:45  57  114/51 (72)    


 


3/13/18 16:31  62  110/53 (72)    


 


3/13/18 16:15  55  123/61 (81)    


 


3/13/18 16:01  56  113/63 (80)    


 


3/13/18 16:00   18     


 


3/13/18 15:46  50  121/63 (82)    


 


3/13/18 15:31  61  126/63 (84)    


 


3/13/18 15:28 98.2  18     


 


3/13/18 15:16  67      


 


3/13/18 15:16    116/76 (89)    


 


3/13/18 15:01    108/78 (88)    


 


3/13/18 15:01  69      


 


3/13/18 14:56 98.2  18     


 


3/13/18 14:45  58  110/55 (73)    


 


3/13/18 14:30  54  109/61 (77)    


 


3/13/18 14:15  67  111/63 (79)    


 


3/13/18 14:00  68 18 112/62 (79)    


 


3/13/18 13:46  55  116/63 (80)    


 


3/13/18 13:31  58  126/69 (88)    


 


3/13/18 13:29 98.1  18     


 


3/13/18 13:15  59  117/69 (85)    


 


3/13/18 13:01  50  113/60 (77)    


 


3/13/18 12:46  52  118/79 (92)    


 


3/13/18 12:31  55  110/75 (87)    


 


3/13/18 12:26   18     


 


3/13/18 12:15  49  110/70 (83)    


 


3/13/18 12:01  54  105/64 (78)    


 


3/13/18 12:00   18     


 


3/13/18 11:46  53  101/55 (70)    


 


3/13/18 11:31  54  101/51 (68)    


 


3/13/18 11:24   18     


 


3/13/18 11:16  52  102/55 (71)    


 


3/13/18 11:02  56  92/49 (63)    


 


3/13/18 11:01  61  93/50 (64)    


 


3/13/18 11:00   18     


 


3/13/18 11:00 97.4       


 


3/13/18 10:46  57  123/58 (79)    


 


3/13/18 10:45  59      


 


3/13/18 10:40  58      


 


3/13/18 10:35  57      


 


3/13/18 10:32  62  106/67 (80)    


 


3/13/18 10:30  62  118/101 (107)    


 


3/13/18 10:30  64      


 


3/13/18 10:29   18     


 


3/13/18 10:25  72  107/64 (78)    


 


3/13/18 10:25  74      


 


3/13/18 10:20  68  106/63 (77)    


 


3/13/18 10:20  65      


 


3/13/18 10:15  72  112/61 (78)    


 


3/13/18 10:15  73      


 


3/13/18 10:10  65      


 


3/13/18 10:10  68  112/59 (76)    


 


3/13/18 10:05  67  114/61 (78)    


 


3/13/18 10:05  66      


 


3/13/18 10:00  70      


 


3/13/18 10:00  65  121/74 (90)    


 


3/13/18 09:59   18     


 


3/13/18 09:56  74  133/81 (98)    


 


3/13/18 09:55  80      


 


3/13/18 09:53  73  130/87 (101)    


 


3/13/18 09:50  86      


 


3/13/18 09:31  62  119/67 (84)    


 


3/13/18 09:01  78  116/69 (85)    


 


3/13/18 08:31  160  145/93 (110)    


 


3/13/18 08:30   18     








Objective Remarks


GENERAL: Well-nourished, well-developed patient.


CARDIOVASCULAR: Regular rate and rhythm without murmurs, gallops, or rubs. 


RESPIRATORY: Breath sounds equal bilaterally. No accessory muscle use.


ABDOMEN/GI: Abdomen soft, non-tender. 


   Fundus: Firm, non-tender at umbilicus.


GENITOURINARY: Light to moderate bleeding.


EXTREMITIES: No cyanosis or edema, non-tender, without signs of DVT.


Medications and IVs





Current Medications








 Medications


  (Trade)  Dose


 Ordered  Sig/Mirta


 Route  Start Time


 Stop Time Status Last Admin


 


  (NS Flush)  2 ml  BID


 IV FLUSH  3/13/18 21:00


     


 


 


  (NS Flush)  2 ml  UNSCH  PRN


 IV FLUSH  3/13/18 18:15


     


 


 


  (Tylenol)  650 mg  Q4H  PRN


 PO  3/13/18 18:15


     


 


 


  (Motrin)  800 mg  Q8H  PRN


 PO  3/13/18 18:15


    3/14/18 02:52


 


 


  (Percocet  5-325


 Mg)  1 tab  Q4H  PRN


 PO  3/13/18 18:15


    3/14/18 06:36


 


 


  (Americaine 20%


 Top Spr)  1 spray  Q4H  PRN


 TOPICAL  3/13/18 18:15


    3/13/18 22:23


 


 


  (Tucks Pads)  1 applic  QID  PRN


 TOPICAL  3/13/18 18:15


    3/13/18 22:22


 


 


  (Christine-Colace)  2 tab  Q12H  PRN


 PO  3/13/18 18:15


    3/13/18 22:23


 


 


  (Ambien)  5 mg  HS  PRN


 PO  3/13/18 21:00


     


 


 


  (Mag-Al Plus


 Susp Liq)  15 ml  Q8H  PRN


 PO  3/13/18 18:15


     


 


 


  (Zofran  Odt)  4 mg  Q6H  PRN


 PO  3/13/18 18:15


     


 


 


  (Stuartnatal


 Plus 3 Prenatal)  1 tab  DAILY


 PO  3/14/18 09:00


     


 











Assessment/Plan


Problem List:  


(1) Vaginal delivery


ICD Codes:  O80 - Encounter for full-term uncomplicated delivery


Assessment and Plan


PPD #1 , Dr Blevins's pt


routine PP care


Discharge Planning


routine


Attending Attestation


seen by Kylie Bernal MD Mar 14, 2018 08:06 Urology Discharge Summary    Patient: Yani Torres MRN: [id number]  SSN: [id number]    YOB: 1950  Age: 73 y.o. Sex: male               ADMISSION:  to Patti Smith MD by Margy Peralta MD  10/12/2022 ADMISSION DIAGNOSIS: Prostate cancer [facility]) Belia Mena  10/14/2022 DISCHARGE DIAGNOSIS: [x]    Same  CONSULTS: None  PROCEDURES: POD# 2 Days Post-Op Procedure(s):  ROBOTIC ASSISTED LAPAROSCOPIC PROSTATECTOMY, BILATERAL PELVIC LYMPH NODE DISSECTION, UMBILICAL HERNIA REPAIR    RECENT LABS: Temp (24hrs), Av.3 °F (36.8 °C), Min:98 °F (36.7 °C), Max:98.5 °F (36.9 °C)    No results found for requested labs within last 720 hours. Recent Labs     10/13/22  0156   BUN 17   CREA 1.09        HOSPITAL COURSE: [x]    Uncomplicated. COMPLICATIONS: [x]    None identified  DISCHARGE TO:     [x]    Home  []    Rehab []    SNF  FOLLOWUP: one week  DISCHARGE MEDS:     [x]    IT IS INTENDED THAT YOU CONTINUE TO TAKE YOUR PRIOR MEDICATIONS WITHOUT CHANGES WITH THE EXCEPTION OF:  []    NONE    Current Discharge Medication List        START taking these medications    Details   HYDROcodone-acetaminophen (Norco) 5-325 mg per tablet Take 1 Tablet by mouth every four (4) hours as needed for Pain for up to 3 days. Max Daily Amount: 6 Tablets. Qty: 20 Tablet, Refills: 0  Start date: 10/12/2022, End date: 10/15/2022    Associated Diagnoses: Prostate cancer ([facility] [address]      docusate sodium (Colace) 100 mg capsule Take 1 Capsule by mouth two (2) times a day for 90 days. Qty: 60 Capsule, Refills: 2  Start date: 10/12/2022, End date: 1/10/2023      ciprofloxacin HCl (Cipro) 500 mg tablet Take 1 Tablet by mouth two (2) times a day for 3 days. Start the day prior to cedeno removal  Qty: 10 Tablet, Refills: 0  Start date: 10/12/2022, End date: 10/15/2022           CONTINUE these medications which have NOT CHANGED    Details   multivit-min/folic/vit K/lycop (MEN'S MULTIVITAMIN PO) Take 1 Tablet by mouth daily. MV-Min-Vit C-Glut-Lysine-Hb124 (Airborne, lysine HCl,) 1,000-50 mg tbef Take 1 Tablet by mouth daily. cholecalciferol, vitamin D3, (VITAMIN D3 PO) Take 1 Gum by mouth daily. ascorbic acid (VITAMIN C PO) Take 1 Tablet by mouth daily.            STOP taking these medications       aspirin delayed-release 81 mg tablet Comments:   Reason for Stopping:                  Lynette Stiles NP 10/14/2022 9:14 AM

## 2022-10-15 ENCOUNTER — APPOINTMENT (OUTPATIENT)
Dept: GENERAL RADIOLOGY | Age: 72
DRG: 708 | End: 2022-10-15
Attending: STUDENT IN AN ORGANIZED HEALTH CARE EDUCATION/TRAINING PROGRAM
Payer: MEDICARE

## 2022-10-15 ENCOUNTER — APPOINTMENT (OUTPATIENT)
Dept: GENERAL RADIOLOGY | Age: 72
DRG: 708 | End: 2022-10-15
Attending: UROLOGY
Payer: MEDICARE

## 2022-10-15 ENCOUNTER — APPOINTMENT (OUTPATIENT)
Dept: GENERAL RADIOLOGY | Age: 72
DRG: 708 | End: 2022-10-15
Attending: NURSE PRACTITIONER
Payer: MEDICARE

## 2022-10-15 LAB
ANION GAP SERPL CALC-SCNC: 3 MMOL/L (ref 5–15)
BUN SERPL-MCNC: 16 MG/DL (ref 6–20)
BUN/CREAT SERPL: 15 (ref 12–20)
CALCIUM SERPL-MCNC: 8.1 MG/DL (ref 8.5–10.1)
CHLORIDE SERPL-SCNC: 109 MMOL/L (ref 97–108)
CO2 SERPL-SCNC: 29 MMOL/L (ref 21–32)
CREAT SERPL-MCNC: 1.07 MG/DL (ref 0.7–1.3)
ERYTHROCYTE [DISTWIDTH] IN BLOOD BY AUTOMATED COUNT: 12.6 % (ref 11.5–14.5)
GLUCOSE SERPL-MCNC: 124 MG/DL (ref 65–100)
HCT VFR BLD AUTO: 38.6 % (ref 36.6–50.3)
HGB BLD-MCNC: 12.8 G/DL (ref 12.1–17)
MCH RBC QN AUTO: 31.7 PG (ref 26–34)
MCHC RBC AUTO-ENTMCNC: 33.2 G/DL (ref 30–36.5)
MCV RBC AUTO: 95.5 FL (ref 80–99)
NRBC # BLD: 0 K/UL (ref 0–0.01)
NRBC BLD-RTO: 0 PER 100 WBC
PLATELET # BLD AUTO: 163 K/UL (ref 150–400)
PMV BLD AUTO: 10.9 FL (ref 8.9–12.9)
POTASSIUM SERPL-SCNC: 3.9 MMOL/L (ref 3.5–5.1)
RBC # BLD AUTO: 4.04 M/UL (ref 4.1–5.7)
SODIUM SERPL-SCNC: 141 MMOL/L (ref 136–145)
WBC # BLD AUTO: 8.9 K/UL (ref 4.1–11.1)

## 2022-10-15 PROCEDURE — 74018 RADEX ABDOMEN 1 VIEW: CPT

## 2022-10-15 PROCEDURE — 80048 BASIC METABOLIC PNL TOTAL CA: CPT

## 2022-10-15 PROCEDURE — 36415 COLL VENOUS BLD VENIPUNCTURE: CPT

## 2022-10-15 PROCEDURE — 74011250636 HC RX REV CODE- 250/636: Performed by: STUDENT IN AN ORGANIZED HEALTH CARE EDUCATION/TRAINING PROGRAM

## 2022-10-15 PROCEDURE — 85027 COMPLETE CBC AUTOMATED: CPT

## 2022-10-15 PROCEDURE — 74011000250 HC RX REV CODE- 250: Performed by: STUDENT IN AN ORGANIZED HEALTH CARE EDUCATION/TRAINING PROGRAM

## 2022-10-15 PROCEDURE — 74011250636 HC RX REV CODE- 250/636: Performed by: UROLOGY

## 2022-10-15 PROCEDURE — 74011250637 HC RX REV CODE- 250/637: Performed by: STUDENT IN AN ORGANIZED HEALTH CARE EDUCATION/TRAINING PROGRAM

## 2022-10-15 PROCEDURE — 74011250636 HC RX REV CODE- 250/636: Performed by: NURSE PRACTITIONER

## 2022-10-15 PROCEDURE — 65270000029 HC RM PRIVATE

## 2022-10-15 PROCEDURE — 74011000250 HC RX REV CODE- 250: Performed by: UROLOGY

## 2022-10-15 PROCEDURE — 74011000258 HC RX REV CODE- 258: Performed by: NURSE PRACTITIONER

## 2022-10-15 RX ORDER — HYDRALAZINE HYDROCHLORIDE 20 MG/ML
20 INJECTION INTRAMUSCULAR; INTRAVENOUS
Status: DISCONTINUED | OUTPATIENT
Start: 2022-10-15 | End: 2022-10-17

## 2022-10-15 RX ADMIN — SODIUM CHLORIDE, PRESERVATIVE FREE 10 ML: 5 INJECTION INTRAVENOUS at 14:25

## 2022-10-15 RX ADMIN — MORPHINE SULFATE 2 MG: 2 INJECTION, SOLUTION INTRAMUSCULAR; INTRAVENOUS at 11:28

## 2022-10-15 RX ADMIN — Medication 1 SPRAY: at 16:55

## 2022-10-15 RX ADMIN — MORPHINE SULFATE 2 MG: 2 INJECTION, SOLUTION INTRAMUSCULAR; INTRAVENOUS at 03:10

## 2022-10-15 RX ADMIN — SODIUM CHLORIDE, PRESERVATIVE FREE 10 ML: 5 INJECTION INTRAVENOUS at 11:31

## 2022-10-15 RX ADMIN — DEXTROSE AND SODIUM CHLORIDE 75 ML/HR: 5; 900 INJECTION, SOLUTION INTRAVENOUS at 00:05

## 2022-10-15 RX ADMIN — SODIUM CHLORIDE, PRESERVATIVE FREE 10 ML: 5 INJECTION INTRAVENOUS at 22:15

## 2022-10-15 RX ADMIN — SODIUM CHLORIDE, PRESERVATIVE FREE 10 ML: 5 INJECTION INTRAVENOUS at 15:24

## 2022-10-15 RX ADMIN — MORPHINE SULFATE 2 MG: 2 INJECTION, SOLUTION INTRAMUSCULAR; INTRAVENOUS at 14:33

## 2022-10-15 RX ADMIN — HYDRALAZINE HYDROCHLORIDE 20 MG: 20 INJECTION INTRAMUSCULAR; INTRAVENOUS at 14:21

## 2022-10-15 RX ADMIN — ONDANSETRON 4 MG: 2 INJECTION INTRAMUSCULAR; INTRAVENOUS at 15:24

## 2022-10-15 RX ADMIN — ONDANSETRON 4 MG: 2 INJECTION INTRAMUSCULAR; INTRAVENOUS at 11:27

## 2022-10-15 RX ADMIN — DEXTROSE AND SODIUM CHLORIDE 75 ML/HR: 5; 900 INJECTION, SOLUTION INTRAVENOUS at 14:37

## 2022-10-15 RX ADMIN — BENZOCAINE 1 SPRAY: 200 SPRAY DENTAL; ORAL; PERIODONTAL at 18:48

## 2022-10-15 RX ADMIN — MORPHINE SULFATE 2 MG: 2 INJECTION, SOLUTION INTRAMUSCULAR; INTRAVENOUS at 22:15

## 2022-10-15 NOTE — CONSULTS
Hospitalist Admission Note    NAME: Kirby Michelle   :  1950   MRN:  [id number]     Date/Time:  10/15/2022 5:47 PM    Patient PCP: Cait Mcintyre MD  ______________________________________________________________________  Given the patient's current clinical presentation, I have a high level of concern for decompensation if discharged from the emergency department. Complex decision making was performed, which includes reviewing the patient's available past medical records, laboratory results, and x-ray films. My assessment of this patient's clinical condition and my plan of care is as follows. Assessment / Plan:  Ileus  KUB c/w ileus. Abdominal distention, n/v.  Plan  NPO, bowel rest  NGT to low continuous suction  Pain management  Continue IVF  Serial abdominal exams  Encourage OOB  Consider TPN and/or gastrografin if ileus x72hrs    Prostate ca  S/p robotic prostatectomy   Primary care per surgery team    Code Status: Full  Surrogate Decision Maker: Wife Annette Enamorado [phone]    DVT Prophylaxis: SCDs  GI Prophylaxis: not indicated    Baseline: Home independent      Subjective:   CHIEF COMPLAINT: Ileus    HISTORY OF PRESENT ILLNESS:     Kirby Michelle is a 73 y.o.  male with PMH of prostate cancer, arthritis, and BPH who is s/p robotic laparoscopic prostatectomy on 10/12 now with ileus. Today patient reports he has pain and abdominal distension. Patient was seen by urology and KUB was ordered that is c/w ileus. We were asked to admit for work up and evaluation of the above problems.      Past Medical History:   Diagnosis Date    Arthritis     BPH (benign prostatic hyperplasia)     Prostate cancer ([address]     Umbilical hernia         Past Surgical History:   Procedure Laterality Date    HX COLONOSCOPY      HX TONSILLECTOMY         Social History     Tobacco Use    Smoking status: Former     Types: Cigarettes     Quit date:      Years since quittin.8 Smokeless tobacco: Never   Substance Use Topics    Alcohol use: Not on file     Comment: rare        Family History   Problem Relation Age of Onset    Diabetes Paternal Grandmother      Allergies   Allergen Reactions    Bactrim [Sulfamethoxazole-Trimethoprim] Rash and Other (comments)     weakness        Prior to Admission medications    Medication Sig Start Date End Date Taking? Authorizing Provider   HYDROcodone-acetaminophen (Norco) 5-325 mg per tablet Take 1 Tablet by mouth every four (4) hours as needed for Pain for up to 3 days. Max Daily Amount: 6 Tablets. 10/12/22 10/15/22 Yes Jaya Gonzalez MD   docusate sodium (Colace) 100 mg capsule Take 1 Capsule by mouth two (2) times a day for 90 days. 10/12/22 1/10/23 Yes Jaya Gonzalez MD   ciprofloxacin HCl (Cipro) 500 mg tablet Take 1 Tablet by mouth two (2) times a day for 3 days. Start the day prior to cedeno removal 10/12/22 10/15/22 Yes Jaya Gonzalez MD   multivit-min/folic/vit K/lycop (MEN'S MULTIVITAMIN PO) Take 1 Tablet by mouth daily. Yes Provider, Historical   MV-Min-Vit C-Glut-Lysine-Hb124 (Airborne, lysine HCl,) 1,000-50 mg tbef Take 1 Tablet by mouth daily. Yes Provider, Historical   aspirin delayed-release 81 mg tablet Take 81 mg by mouth daily. Provider, Historical   cholecalciferol, vitamin D3, (VITAMIN D3 PO) Take 1 Gum by mouth daily. Provider, Historical   ascorbic acid (VITAMIN C PO) Take 1 Tablet by mouth daily. Provider, Historical       REVIEW OF SYSTEMS:     I am not able to complete the review of systems because:    The patient is intubated and sedated    The patient has altered mental status due to his acute medical problems    The patient has baseline aphasia from prior stroke(s)    The patient has baseline dementia and is not reliable historian    The patient is in acute medical distress and unable to provide information           Total of 12 systems reviewed as follows:       POSITIVE= underlined text  Negative = text not underlined  General:  fever, chills, sweats, generalized weakness, weight loss/gain,      loss of appetite   Eyes:    blurred vision, eye pain, loss of vision, double vision  ENT:    rhinorrhea, pharyngitis   Respiratory:   cough, sputum production, SOB, SYLVESTER, wheezing, pleuritic pain   Cardiology:   chest pain, palpitations, orthopnea, PND, edema, syncope   Gastrointestinal:  abdominal pain , N/V, diarrhea, dysphagia, constipation, bleeding   Genitourinary:  frequency, urgency, dysuria, hematuria, incontinence   Muskuloskeletal :  arthralgia, myalgia, back pain  Hematology:  easy bruising, nose or gum bleeding, lymphadenopathy   Dermatological: rash, ulceration, pruritis, color change / jaundice  Endocrine:   hot flashes or polydipsia   Neurological:  headache, dizziness, confusion, focal weakness, paresthesia,     Speech difficulties, memory loss, gait difficulty  Psychological: Feelings of anxiety, depression, agitation    Objective:   VITALS:    Visit Vitals  BP (!) 165/90   Pulse 93   Temp 97.7 °F (36.5 °C)   Resp 16   Ht 5' 7.5\" (1.715 m)   Wt 85 kg (187 lb 6.3 oz)   SpO2 92%   BMI 28.92 kg/m²       PHYSICAL EXAM:    General:    Alert, cooperative, no distress, appears stated age. HEENT: Atraumatic, anicteric sclerae, pink conjunctivae     No oral ulcers, mucosa moist, throat clear, dentition fair  Neck:  Supple, symmetrical,  thyroid: non tender  Lungs:   Clear to auscultation bilaterally. No Wheezing or Rhonchi. No rales. Chest wall:  No tenderness  No Accessory muscle use. Heart:   Regular  rhythm,  No  murmur   No edema  Abdomen:   Soft, non-tender. Not distended. Bowel sounds normal. Abdominal distension and diffusely TTP. Extremities: No cyanosis. No clubbing,      Skin turgor normal, Capillary refill normal, Radial dial pulse 2+  Skin:     Not pale. Not Jaundiced  No rashes   Psych:  Good insight. Not depressed. Not anxious or agitated. Neurologic: EOMs intact. No facial asymmetry.  No aphasia or slurred speech. Symmetrical strength, Sensation grossly intact. Alert and oriented X 4.     _______________________________________________________________________  Care Plan discussed with:    Comments   Patient x    Family  x At bedside   RN     Care Manager                    Consultant:      _______________________________________________________________________  Expected  Disposition:   Home with Family x   HH/PT/OT/RN    SNF/LTC    BOY    ________________________________________________________________________  TOTAL TIME:  28 Minutes    Critical Care Provided     Minutes non procedure based      Comments    x Reviewed previous records   >50% of visit spent in counseling and coordination of care  Discussion with patient and/or family and questions answered       ________________________________________________________________________  Signed: Dariel Ortiz MD    Procedures: see electronic medical records for all procedures/Xrays and details which were not copied into this note but were reviewed prior to creation of Plan.     LAB DATA REVIEWED:    Recent Results (from the past 24 hour(s))   METABOLIC PANEL, BASIC    Collection Time: 10/15/22  5:52 AM   Result Value Ref Range    Sodium 141 136 - 145 mmol/L    Potassium 3.9 3.5 - 5.1 mmol/L    Chloride 109 (H) 97 - 108 mmol/L    CO2 29 21 - 32 mmol/L    Anion gap 3 (L) 5 - 15 mmol/L    Glucose 124 (H) 65 - 100 mg/dL    BUN 16 6 - 20 MG/DL    Creatinine 1.07 0.70 - 1.30 MG/DL    BUN/Creatinine ratio 15 12 - 20      eGFR >60 >60 ml/min/1.73m2    Calcium 8.1 (L) 8.5 - 10.1 MG/DL   CBC W/O DIFF    Collection Time: 10/15/22  5:52 AM   Result Value Ref Range    WBC 8.9 4.1 - 11.1 K/uL    RBC 4.04 (L) 4.10 - 5.70 M/uL    HGB 12.8 12.1 - 17.0 g/dL    HCT 38.6 36.6 - 50.3 %    MCV 95.5 80.0 - 99.0 FL    MCH 31.7 26.0 - 34.0 PG    MCHC 33.2 30.0 - 36.5 g/dL    RDW 12.6 11.5 - 14.5 %    PLATELET 049 822 - 214 K/uL    MPV 10.9 8.9 - 12.9 FL    NRBC 0.0 0 PER 100 WBC    ABSOLUTE NRBC 0.00 0.00 - 0.01 K/uL

## 2022-10-15 NOTE — PROGRESS NOTES
Message sent to Dr. Lucero Hinkle re: BP remains elevated at 179/104 after the Morphine about an hour ago, pain is better, but BP is not. Can we give him some Hydralazine?

## 2022-10-15 NOTE — PROGRESS NOTES
Message sent to Dr. José Manuel Sanches re: OK, still not feeling very well. Nausea some dry heaving at times, no vomiting. BP still elevated at 165/90 after Hydralazine. Do you think an NGT may help?

## 2022-10-15 NOTE — PROGRESS NOTES
Problem: Falls - Risk of  Goal: *Absence of Falls  Description: Document Sherwood Fall Risk and appropriate interventions in the flowsheet.   Outcome: Progressing Towards Goal  Note: Fall Risk Interventions:  Mobility Interventions: Patient to call before getting OOB         Medication Interventions: Patient to call before getting OOB, Teach patient to arise slowly    Elimination Interventions: Call light in reach, Stay With Me (per policy), Toileting schedule/hourly rounds              Problem: Patient Education: Go to Patient Education Activity  Goal: Patient/Family Education  Outcome: Progressing Towards Goal

## 2022-10-15 NOTE — PROGRESS NOTES
NGT pulled back to about 50 cm and then advanced again to 70 cm. Pt tolerated better than insertion. Will check with Dr. Regina Judd to see if she would like an xray again, not much gastric contents coming out of NGT.

## 2022-10-15 NOTE — PROGRESS NOTES
Progress Note    Patient: Terell Soriano MRN: [id number]  SSN: [id number]    YOB: 1950  Age: 73 y.o. Sex: male          ADMITTED:  10/12/2022 to Kerrie Monahan MD  for Prostate cancer [facility]) Heri  is 2 Days Post-Op Procedure(s) with comments:  ROBOTIC ASSISTED LAPAROSCOPIC PROSTATECTOMY, BILATERAL PELVIC LYMPH NODE DISSECTION, UMBILICAL HERNIA REPAIR - ( E R A S). He is doing fair. He has moderate pain. He has no nausea. He is currently NPO, will advance diet pending KUB. Indwelling catheter is draining well. Continues to c/o gas pain, no flatus or BM yet. Vss. Wanting to go home. Suggs with Good UOP, clear yellow. Vitals:  Temp (24hrs), Av.9 °F (36.6 °C), Min:97.5 °F (36.4 °C), Max:98.4 °F (36.9 °C)     Blood pressure (!) 158/103, pulse 86, temperature 98 °F (36.7 °C), resp. rate 17, height 5' 7.5\" (1.715 m), weight 85 kg (187 lb 6.3 oz), SpO2 95 %. I&O's:  10/13 1901 - 10/15 0700  In: 1813.8 [P.O.:720; I.V.:1093.8]  Out: 6234 [Urine:2875]   10/15 0701 - 10/15 1900  In: 380 [I.V.:380]  Out: 425 [Urine:425]     Exam:   abdomen soft, appropriately TTP at surgical sites. All incisions clean/dry/intact without evidence of infection. Suggs with clear urine output.      Labs:   Recent Labs     10/15/22  0552 10/13/22  0156   WBC 8.9  --    HGB 12.8 11.6*   HCT 38.6 34.8*     --        Recent Labs     10/15/22  0552 10/13/22  0156    138   K 3.9 4.4   * 108   CO2 29 26   * 134*   BUN 16 17   CREA 1.07 1.09   CA 8.1* 7.5*          Cultures:    NA     Imaging:   NA     Assessment:     - 3 Days Post-Op Procedure(s) with comments:  ROBOTIC ASSISTED LAPAROSCOPIC PROSTATECTOMY, BILATERAL PELVIC LYMPH NODE DISSECTION, UMBILICAL HERNIA REPAIR - ( E R A S)    Active Problems:    Prostate cancer ([facility] [address] (10/12/2022)      Plan:     -KUB significant for Ileus  -continue ambulation as able  -continue IVF  -Hospitalist consulted for assistance with management  -home with cedeno, nursing to provide teaching   -pt aware of follow up next week with Dr. Luz Elena Patiño to discuss pathology       Signed By: VIKTORIA Wilkinson - October 15, 2022

## 2022-10-15 NOTE — PROGRESS NOTES
End of Shift Note    Bedside shift change report given to Dena Lawson RN (oncoming nurse) by Rusty Velásquez RN (offgoing nurse). Report included the following information SBAR, Kardex, OR Summary, Procedure Summary, Intake/Output, MAR, and Recent Results    Shift worked:  7 am - 7 p,      Shift summary and any significant changes:     Abd xray done today showing ileus, NGT placed to low continuous suction due to dry heaving and increased abdominal discomfort. Pt remains NPO with encouragement to walk. NGT readjusted just now, xray ordered to see it it is in better position. Concerns for physician to address:  none     Zone phone for oncoming shift:   [phone]       Activity:  Activity Level: Ambulate X (#)  Number times ambulated in hallways past shift: 2 with walker  Number of times OOB to chair past shift: 2,     Cardiac:   Cardiac Monitoring: No      Cardiac Rhythm: Sinus Rhythm    Access:  Current line(s): PIV     Genitourinary:   Urinary status: cedeno    Respiratory:   O2 Device: None (Room air)  Chronic home O2 use?: NO  Incentive spirometer at bedside: YES       GI:  Last Bowel Movement Date: 10/12/22  Current diet:  DIET NPO  Passing flatus: YES  Tolerating current diet: YES       Pain Management:   Patient states pain is manageable on current regimen: YES    Skin:  Robert Score: 20  Interventions: increase time out of bed    Patient Safety:  Fall Score:  Total Score: 3  Interventions: assistive device (walker, cane, etc) and gripper socks  High Fall Risk: Yes    Length of Stay:  Expected LOS: 1d 9h  Actual LOS: [address], RN

## 2022-10-15 NOTE — PROGRESS NOTES
Message sent to Dr. Delia Bro re: Did you read the abd xray, looks like we need to adjust the NGT, pt is saying he wants it out, wife wants us to encourage him to use it. If I have to adjust it can I order some Hurricaine spray to spray back of throat before I manipulate the tube?

## 2022-10-16 PROCEDURE — 74011000258 HC RX REV CODE- 258: Performed by: NURSE PRACTITIONER

## 2022-10-16 PROCEDURE — 74011250636 HC RX REV CODE- 250/636: Performed by: UROLOGY

## 2022-10-16 PROCEDURE — 65270000029 HC RM PRIVATE

## 2022-10-16 PROCEDURE — 74011000250 HC RX REV CODE- 250: Performed by: UROLOGY

## 2022-10-16 PROCEDURE — 74011250637 HC RX REV CODE- 250/637: Performed by: UROLOGY

## 2022-10-16 PROCEDURE — 74011250636 HC RX REV CODE- 250/636: Performed by: NURSE PRACTITIONER

## 2022-10-16 PROCEDURE — 74011250636 HC RX REV CODE- 250/636: Performed by: STUDENT IN AN ORGANIZED HEALTH CARE EDUCATION/TRAINING PROGRAM

## 2022-10-16 RX ORDER — KETOROLAC TROMETHAMINE 30 MG/ML
15 INJECTION, SOLUTION INTRAMUSCULAR; INTRAVENOUS
Status: DISCONTINUED | OUTPATIENT
Start: 2022-10-16 | End: 2022-10-18 | Stop reason: HOSPADM

## 2022-10-16 RX ORDER — ACETAMINOPHEN 500 MG
1000 TABLET ORAL EVERY 6 HOURS
Status: DISCONTINUED | OUTPATIENT
Start: 2022-10-16 | End: 2022-10-18 | Stop reason: HOSPADM

## 2022-10-16 RX ADMIN — ONDANSETRON 4 MG: 2 INJECTION INTRAMUSCULAR; INTRAVENOUS at 20:13

## 2022-10-16 RX ADMIN — SODIUM CHLORIDE, PRESERVATIVE FREE 10 ML: 5 INJECTION INTRAVENOUS at 21:44

## 2022-10-16 RX ADMIN — DOCUSATE SODIUM 100 MG: 100 CAPSULE ORAL at 09:46

## 2022-10-16 RX ADMIN — SODIUM CHLORIDE, PRESERVATIVE FREE 10 ML: 5 INJECTION INTRAVENOUS at 13:12

## 2022-10-16 RX ADMIN — SODIUM CHLORIDE, PRESERVATIVE FREE 10 ML: 5 INJECTION INTRAVENOUS at 11:46

## 2022-10-16 RX ADMIN — ACETAMINOPHEN 1000 MG: 500 TABLET ORAL at 11:46

## 2022-10-16 RX ADMIN — DEXTROSE AND SODIUM CHLORIDE 75 ML/HR: 5; 900 INJECTION, SOLUTION INTRAVENOUS at 17:30

## 2022-10-16 RX ADMIN — ACETAMINOPHEN 1000 MG: 500 TABLET ORAL at 15:31

## 2022-10-16 RX ADMIN — KETOROLAC TROMETHAMINE 15 MG: 30 INJECTION, SOLUTION INTRAMUSCULAR at 16:14

## 2022-10-16 RX ADMIN — DEXTROSE AND SODIUM CHLORIDE 75 ML/HR: 5; 900 INJECTION, SOLUTION INTRAVENOUS at 02:37

## 2022-10-16 RX ADMIN — OXYCODONE 5 MG: 5 TABLET ORAL at 20:13

## 2022-10-16 RX ADMIN — DOCUSATE SODIUM 100 MG: 100 CAPSULE ORAL at 15:32

## 2022-10-16 RX ADMIN — HYDRALAZINE HYDROCHLORIDE 20 MG: 20 INJECTION INTRAMUSCULAR; INTRAVENOUS at 09:50

## 2022-10-16 RX ADMIN — MORPHINE SULFATE 2 MG: 2 INJECTION, SOLUTION INTRAMUSCULAR; INTRAVENOUS at 05:49

## 2022-10-16 RX ADMIN — SODIUM CHLORIDE, PRESERVATIVE FREE 10 ML: 5 INJECTION INTRAVENOUS at 05:29

## 2022-10-16 RX ADMIN — HYDRALAZINE HYDROCHLORIDE 20 MG: 20 INJECTION INTRAMUSCULAR; INTRAVENOUS at 17:28

## 2022-10-16 RX ADMIN — SODIUM CHLORIDE, PRESERVATIVE FREE 10 ML: 5 INJECTION INTRAVENOUS at 09:57

## 2022-10-16 NOTE — PROGRESS NOTES
End of Shift Note    Bedside shift change report given to Rosalina Bardales RN (oncoming nurse) by Percy Yepez RN (offgoing nurse). Report included the following information SBAR, Kardex, OR Summary, Procedure Summary, Intake/Output, MAR, and Recent Results    Shift worked:  7 am - 7 p,      Shift summary and any significant changes:     Diet advanced to clear liquids, passing flatus,no BM yet. Xray for am.  Lab work in am. IV fluid rate decreased to 50 ml/hr. Ambulating in hallway. Concerns for physician to address:  none     Zone phone for oncoming shift:   [phone]       Activity:  Activity Level: Ambulate X (#)  Number times ambulated in hallways past shift: 2 with walker  Number of times OOB to chair past shift: 2,     Cardiac:   Cardiac Monitoring: No      Cardiac Rhythm: Sinus Rhythm    Access:  Current line(s): PIV     Genitourinary:   Urinary status: cedeno    Respiratory:   O2 Device: None (Room air)  Chronic home O2 use?: NO  Incentive spirometer at bedside: YES  Actual Volume (ml): 500 ml    GI:  Last Bowel Movement Date: 10/12/22  Current diet:  ADULT DIET Clear Liquid  Passing flatus: YES  Tolerating current diet: YES       Pain Management:   Patient states pain is manageable on current regimen: YES    Skin:  Robert Score: 19  Interventions: increase time out of bed    Patient Safety:  Fall Score:  Total Score: 3  Interventions: assistive device (walker, cane, etc) and gripper socks  High Fall Risk: Yes    Length of Stay:  Expected LOS: 1d 9h  Actual LOS: [address], RN

## 2022-10-16 NOTE — PROGRESS NOTES
Message sent to Hospitalist re:BP remains elevated today, just gave a 2nd dose of Hydralazine for /87.,   he is on clear liquid diet now, not using a lot of pain medicine, do you think his IV fluids should be decreased a little?

## 2022-10-16 NOTE — CONSULTS
Hospitalist Progress Note    NAME: Carson Santana   :  1950   MRN:  [id number]            Subjective:     Chief Complaint / Reason for Physician Visit  Patient seen and evaluated at bedside, overnight events reviewed, patient is no passing flatus, hasn't had a bowel movement. Discussed with RN events overnight. Review of Systems:  Symptom Y/N Comments  Symptom Y/N Comments   Fever/Chills N   Chest Pain N    Poor Appetite Y   Edema N    Cough N   Abdominal Pain Y    Sputum N   Joint Pain N    SOB/SYLVESTER N   Pruritis/Rash N    Nausea/vomit N   Tolerating PT/OT NA    Diarrhea N   Tolerating Diet Y    Constipation N   Other       Could NOT obtain due to:      Objective:     VITALS:   Last 24hrs VS reviewed since prior progress note. Most recent are:  Patient Vitals for the past 24 hrs:   Temp Pulse Resp BP SpO2   10/15/22 2305 98.6 °F (37 °C) 92 16 (!) 160/81 95 %   10/15/22 1935 97.8 °F (36.6 °C) 89 16 (!) 154/84 95 %   10/15/22 1541 -- 93 16 (!) 165/90 92 %   10/15/22 1520 -- 98 18 (!) 160/99 94 %   10/15/22 1436 -- -- -- -- 93 %   10/15/22 1421 97.7 °F (36.5 °C) 86 16 (!) 188/117 91 %   10/15/22 1232 -- 75 -- (!) 179/104 --   10/15/22 1112 98 °F (36.7 °C) 81 18 (!) 187/107 100 %   10/15/22 1058 97.6 °F (36.4 °C) 85 17 (!) 176/98 94 %       Intake/Output Summary (Last 24 hours) at 10/16/2022 0938  Last data filed at 10/16/2022 0170  Gross per 24 hour   Intake 1748.75 ml   Output 1475 ml   Net 273.75 ml        PHYSICAL EXAM:  General: Patient appears comfortable    EENT:  EOMI. Anicteric sclerae. MMM  Resp:  CTA bilaterally, no wheezing or rales. No accessory muscle use  CV:  Regular  rhythm, s1/s2 no m/r/g No edema  GI:  Soft, Non distended, Non tender. +Bowel sounds  Neurologic:  Alert and oriented X 3, normal speech,   Psych:   Good insight. Not anxious nor agitated  Skin:  No rashes.   No jaundice    Procedures: see electronic medical records for all procedures/Xrays and details which were not copied into this note but were reviewed prior to creation of Plan. LABS:  I reviewed today's most current labs and imaging studies. Pertinent labs include:  Recent Labs     10/15/22  0552   WBC 8.9   HGB 12.8   HCT 38.6        Recent Labs     10/15/22  0552      K 3.9   *   CO2 29   *   BUN 16   CREA 1.07   CA 8.1*       Signed: Fracisco Sullivan MD        Reviewed most current lab test results and cultures  YES  Reviewed most current radiology test results   YES  Review and summation of old records today    NO  Reviewed patient's current orders and MAR    YES  PMH/ reviewed - no change compared to H&P      Assessment / Plan:  Post operative ileus - patient now able to pass flatus, benign abdominal examination  Continue serial abdominal exams  Obtain repeat KUB in AM  Continue NPO/Bowel Rest  MIVF  Continue to monitor clinical status    Hx of prostate CA s/p prostatectomy - management as per Urology attending    I thank you  For allowing me to participate in the care of your patient. Hospitalist team will continue to follow      25.0 - 29.9 Overweight / Body mass index is 28.92 kg/m². Code status: Full  Prophylaxis: SCD's  Recommended Disposition:  TBD     ________________________________________________________________________  Care Plan discussed with:    Comments   Patient x    Family      RN x    Care Manager x    Consultant  x                     x Multidiciplinary team rounds were held today with , nursing, pharmacist and clinical coordinator. Patient's plan of care was discussed; medications were reviewed and discharge planning was addressed.      ________________________________________________________________________  Total NON critical care TIME:  35   Minutes      Comments   >50% of visit spent in counseling and coordination of care x    ________________________________________________________________________  Setauket Smart, MD

## 2022-10-16 NOTE — PROGRESS NOTES
Transition of Care Plan:    RUR:4   ERNIE: 10/17  Disposition: Plan Home once cleared by Urology. 10:43 AM  CM talked to Pt and he stated plan is mobility today and hope to DC after lunch tomorrow. Follow up appointments: PCP: Nicole Hinojosa: 10/21 at 10:20 AM  Urology: Office will call you to schedule. DME needed: no  Transportation at Discharge: Wife  [address] or means to access home:     Wife   IM Medicare Letter:1st  letter: 10/14  Is patient a [employer] and connected with the [place]?              no  If yes, was [employer] transfer form completed and [place] notified? Caregiver Contact: Spouse: Rey Segal: [phone]  Discharge Caregiver contacted prior to discharge? yes  Care Conference needed?:    no.                 CM will continue to monitor discharge plan.      Alfonso Torres, Weekend CM   Ext [phone]

## 2022-10-16 NOTE — PROGRESS NOTES
Better but still burping/hiccups. Now passing flatus. NGT came out and patient refused it going back in. Last KUB still shows a fair amount of bowel distention and what appears consistent with ileus. No n/v since NGT out. No BM. OOB yesterday. Feels weak. Accompanied by his wife. Has not tolerated narcotics well in the past. At first he voiced the desire to go home today but we all agree to continue to SLOWLY advance him with the initiation of flatus. Mild distention. 1.675L UOP. Urine yellow. - Avoid narcotics. Start scheduled tylenol and preferentially use IV ketorolac  - Ambulate  - IS  - Clear liquid diet today. He knows to start SLOW with this  - Continue mIVF at 75/hr for now  - Continue colace.  Start senna  - Discontinue trospium

## 2022-10-16 NOTE — PROGRESS NOTES
Patient started vomiting and while patient was vomiting the NG tube was dislodged and the patient stated he did not want the NG tube to be reinserted. MD was made aware. Patient tolerated NG tube removal well. Also, patient was able to pass flatus, which he stated \" I was able to finally pass gas and it gave me some relief. \"     MD was made aware and no new orders were placed at this time. Patient is to remain NPO.

## 2022-10-16 NOTE — PROGRESS NOTES
Problem: Falls - Risk of  Goal: *Absence of Falls  Description: Document Haylee Talbert Fall Risk and appropriate interventions in the flowsheet.   Outcome: Progressing Towards Goal  Note: Fall Risk Interventions:  Mobility Interventions: Assess mobility with egress test, Patient to call before getting OOB, Utilize walker, cane, or other assistive device         Medication Interventions: Patient to call before getting OOB, Teach patient to arise slowly    Elimination Interventions: Call light in reach, Toilet paper/wipes in reach, Toileting schedule/hourly rounds              Problem: Patient Education: Go to Patient Education Activity  Goal: Patient/Family Education  Outcome: Progressing Towards Goal     Problem: Pain  Goal: *Control of Pain  Outcome: Progressing Towards Goal     Problem: Patient Education: Go to Patient Education Activity  Goal: Patient/Family Education  Outcome: Progressing Towards Goal

## 2022-10-17 ENCOUNTER — APPOINTMENT (OUTPATIENT)
Dept: CT IMAGING | Age: 72
DRG: 708 | End: 2022-10-17
Attending: NURSE PRACTITIONER
Payer: MEDICARE

## 2022-10-17 ENCOUNTER — APPOINTMENT (OUTPATIENT)
Dept: GENERAL RADIOLOGY | Age: 72
DRG: 708 | End: 2022-10-17
Attending: INTERNAL MEDICINE
Payer: MEDICARE

## 2022-10-17 LAB
ANION GAP SERPL CALC-SCNC: 7 MMOL/L (ref 5–15)
ANION GAP SERPL CALC-SCNC: NORMAL MMOL/L (ref 5–15)
BUN SERPL-MCNC: 16 MG/DL (ref 6–20)
BUN SERPL-MCNC: NORMAL MG/DL (ref 6–20)
BUN/CREAT SERPL: 20 (ref 12–20)
BUN/CREAT SERPL: NORMAL (ref 12–20)
CALCIUM SERPL-MCNC: 8.2 MG/DL (ref 8.5–10.1)
CALCIUM SERPL-MCNC: NORMAL MG/DL (ref 8.5–10.1)
CHLORIDE SERPL-SCNC: 106 MMOL/L (ref 97–108)
CHLORIDE SERPL-SCNC: NORMAL MMOL/L (ref 97–108)
CO2 SERPL-SCNC: 25 MMOL/L (ref 21–32)
CO2 SERPL-SCNC: NORMAL MMOL/L (ref 21–32)
CREAT SERPL-MCNC: 0.82 MG/DL (ref 0.7–1.3)
CREAT SERPL-MCNC: NORMAL MG/DL (ref 0.7–1.3)
ERYTHROCYTE [DISTWIDTH] IN BLOOD BY AUTOMATED COUNT: NORMAL % (ref 11.5–14.5)
GLUCOSE BLD STRIP.AUTO-MCNC: 104 MG/DL (ref 65–117)
GLUCOSE BLD STRIP.AUTO-MCNC: 144 MG/DL (ref 65–117)
GLUCOSE BLD STRIP.AUTO-MCNC: 163 MG/DL (ref 65–117)
GLUCOSE SERPL-MCNC: 277 MG/DL (ref 65–100)
GLUCOSE SERPL-MCNC: NORMAL MG/DL (ref 65–100)
HCT VFR BLD AUTO: NORMAL % (ref 36.6–50.3)
HGB BLD-MCNC: NORMAL G/DL (ref 12.1–17)
MAGNESIUM SERPL-MCNC: NORMAL MG/DL (ref 1.6–2.4)
MCH RBC QN AUTO: NORMAL PG (ref 26–34)
MCHC RBC AUTO-ENTMCNC: NORMAL G/DL (ref 30–36.5)
MCV RBC AUTO: NORMAL FL (ref 80–99)
NRBC # BLD: NORMAL K/UL (ref 0–0.01)
NRBC BLD-RTO: NORMAL PER 100 WBC
PLATELET # BLD AUTO: NORMAL K/UL (ref 150–400)
PMV BLD AUTO: NORMAL FL (ref 8.9–12.9)
POTASSIUM SERPL-SCNC: 3.4 MMOL/L (ref 3.5–5.1)
POTASSIUM SERPL-SCNC: NORMAL MMOL/L (ref 3.5–5.1)
RBC # BLD AUTO: NORMAL M/UL (ref 4.1–5.7)
SERVICE CMNT-IMP: ABNORMAL
SERVICE CMNT-IMP: ABNORMAL
SERVICE CMNT-IMP: NORMAL
SODIUM SERPL-SCNC: 138 MMOL/L (ref 136–145)
SODIUM SERPL-SCNC: NORMAL MMOL/L (ref 136–145)
WBC # BLD AUTO: NORMAL K/UL (ref 4.1–11.1)

## 2022-10-17 PROCEDURE — 74011000258 HC RX REV CODE- 258: Performed by: INTERNAL MEDICINE

## 2022-10-17 PROCEDURE — 74011250636 HC RX REV CODE- 250/636: Performed by: NURSE PRACTITIONER

## 2022-10-17 PROCEDURE — 85027 COMPLETE CBC AUTOMATED: CPT

## 2022-10-17 PROCEDURE — 74011000250 HC RX REV CODE- 250: Performed by: UROLOGY

## 2022-10-17 PROCEDURE — 36415 COLL VENOUS BLD VENIPUNCTURE: CPT

## 2022-10-17 PROCEDURE — 74011636637 HC RX REV CODE- 636/637: Performed by: NURSE PRACTITIONER

## 2022-10-17 PROCEDURE — 74011250636 HC RX REV CODE- 250/636: Performed by: STUDENT IN AN ORGANIZED HEALTH CARE EDUCATION/TRAINING PROGRAM

## 2022-10-17 PROCEDURE — 74011250637 HC RX REV CODE- 250/637: Performed by: NURSE PRACTITIONER

## 2022-10-17 PROCEDURE — 74176 CT ABD & PELVIS W/O CONTRAST: CPT

## 2022-10-17 PROCEDURE — 74011250636 HC RX REV CODE- 250/636: Performed by: INTERNAL MEDICINE

## 2022-10-17 PROCEDURE — 80048 BASIC METABOLIC PNL TOTAL CA: CPT

## 2022-10-17 PROCEDURE — 74019 RADEX ABDOMEN 2 VIEWS: CPT

## 2022-10-17 PROCEDURE — 82962 GLUCOSE BLOOD TEST: CPT

## 2022-10-17 PROCEDURE — 74011250637 HC RX REV CODE- 250/637: Performed by: UROLOGY

## 2022-10-17 PROCEDURE — 83735 ASSAY OF MAGNESIUM: CPT

## 2022-10-17 PROCEDURE — 65270000029 HC RM PRIVATE

## 2022-10-17 RX ORDER — MAGNESIUM SULFATE 100 %
4 CRYSTALS MISCELLANEOUS AS NEEDED
Status: DISCONTINUED | OUTPATIENT
Start: 2022-10-17 | End: 2022-10-18 | Stop reason: HOSPADM

## 2022-10-17 RX ORDER — HYDRALAZINE HYDROCHLORIDE 20 MG/ML
10 INJECTION INTRAMUSCULAR; INTRAVENOUS
Status: DISCONTINUED | OUTPATIENT
Start: 2022-10-17 | End: 2022-10-18 | Stop reason: HOSPADM

## 2022-10-17 RX ORDER — POTASSIUM CHLORIDE 750 MG/1
10 TABLET, FILM COATED, EXTENDED RELEASE ORAL
Status: COMPLETED | OUTPATIENT
Start: 2022-10-17 | End: 2022-10-17

## 2022-10-17 RX ORDER — POTASSIUM CHLORIDE 750 MG/1
10 TABLET, FILM COATED, EXTENDED RELEASE ORAL ONCE
Status: DISPENSED | OUTPATIENT
Start: 2022-10-17 | End: 2022-10-17

## 2022-10-17 RX ORDER — SODIUM CHLORIDE 9 MG/ML
75 INJECTION, SOLUTION INTRAVENOUS CONTINUOUS
Status: DISCONTINUED | OUTPATIENT
Start: 2022-10-17 | End: 2022-10-18

## 2022-10-17 RX ORDER — INSULIN LISPRO 100 [IU]/ML
INJECTION, SOLUTION INTRAVENOUS; SUBCUTANEOUS
Status: DISCONTINUED | OUTPATIENT
Start: 2022-10-17 | End: 2022-10-18 | Stop reason: HOSPADM

## 2022-10-17 RX ORDER — DEXTROSE MONOHYDRATE 100 MG/ML
0-250 INJECTION, SOLUTION INTRAVENOUS AS NEEDED
Status: DISCONTINUED | OUTPATIENT
Start: 2022-10-17 | End: 2022-10-18 | Stop reason: HOSPADM

## 2022-10-17 RX ADMIN — OXYCODONE 5 MG: 5 TABLET ORAL at 01:04

## 2022-10-17 RX ADMIN — SODIUM CHLORIDE 100 ML/HR: 9 INJECTION, SOLUTION INTRAVENOUS at 13:37

## 2022-10-17 RX ADMIN — ACETAMINOPHEN 1000 MG: 500 TABLET ORAL at 06:15

## 2022-10-17 RX ADMIN — HYDRALAZINE HYDROCHLORIDE 20 MG: 20 INJECTION INTRAMUSCULAR; INTRAVENOUS at 09:41

## 2022-10-17 RX ADMIN — POTASSIUM CHLORIDE 10 MEQ: 750 TABLET, FILM COATED, EXTENDED RELEASE ORAL at 12:27

## 2022-10-17 RX ADMIN — SODIUM CHLORIDE, PRESERVATIVE FREE 10 ML: 5 INJECTION INTRAVENOUS at 06:17

## 2022-10-17 RX ADMIN — SODIUM CHLORIDE 500 ML: 9 INJECTION, SOLUTION INTRAVENOUS at 12:27

## 2022-10-17 RX ADMIN — Medication 2 UNITS: at 23:01

## 2022-10-17 RX ADMIN — ACETAMINOPHEN 1000 MG: 500 TABLET ORAL at 17:52

## 2022-10-17 RX ADMIN — OXYCODONE 5 MG: 5 TABLET ORAL at 06:15

## 2022-10-17 RX ADMIN — SODIUM CHLORIDE, PRESERVATIVE FREE 10 ML: 5 INJECTION INTRAVENOUS at 17:52

## 2022-10-17 RX ADMIN — ACETAMINOPHEN 1000 MG: 500 TABLET ORAL at 01:04

## 2022-10-17 RX ADMIN — HYDRALAZINE HYDROCHLORIDE 10 MG: 20 INJECTION INTRAMUSCULAR; INTRAVENOUS at 14:52

## 2022-10-17 RX ADMIN — SODIUM CHLORIDE, PRESERVATIVE FREE 10 ML: 5 INJECTION INTRAVENOUS at 23:00

## 2022-10-17 RX ADMIN — OXYCODONE 5 MG: 5 TABLET ORAL at 23:50

## 2022-10-17 RX ADMIN — ACETAMINOPHEN 1000 MG: 500 TABLET ORAL at 12:27

## 2022-10-17 RX ADMIN — DOCUSATE SODIUM 100 MG: 100 CAPSULE ORAL at 09:41

## 2022-10-17 RX ADMIN — DOCUSATE SODIUM 100 MG: 100 CAPSULE ORAL at 17:52

## 2022-10-17 RX ADMIN — DEXTROSE AND SODIUM CHLORIDE 50 ML/HR: 5; 900 INJECTION, SOLUTION INTRAVENOUS at 09:42

## 2022-10-17 NOTE — PROGRESS NOTES
Hospitalist Progress Note    NAME: Hua Rosenthal   :  1950   MRN:  [id number]       Assessment / Plan:  Post operative ileus -   patient now able to pass flatus, benign abdominal examination  Continue serial abdominal exams  KUB showed improved ileus  Allowed clear liquid diet   continue to monitor clinical status    Elevated blood pressure without diagnosis of hypertension  Hypokalemia  Systolic blood pressure the 180s, patient has no diagnosis of hypertension  Patient is on IV fluid, will reduce IV fluid rate  As needed hydralazine  Need to be started on BP meds if BP persistently elevated    Hx of prostate CA s/p prostatectomy - management as per Urology attending        25.0 - 29.9 Overweight / Body mass index is 28.92 kg/m². Code status: Full  Prophylaxis: SCD's  Recommended Disposition:  TBD      25.0 - 29.9 Overweight / Body mass index is 28.92 kg/m². Estimated discharge date: Primary team   barriers:  Discussed with patient wife at bedside  Code status: Full  Prophylaxis:  Per primary  Recommended Disposition:  PT, OT, RN     Subjective:     Chief Complaint / Reason for Physician Visit  Follow-up ileus, elevated BP discussed with RN events overnight. Patient feels weak  Review of Systems:  Symptom Y/N Comments  Symptom Y/N Comments   Fever/Chills    Chest Pain n    Poor Appetite y   Edema     Cough    Abdominal Pain n    Sputum    Joint Pain     SOB/SYLVESTER n   Pruritis/Rash     Nausea/vomit n   Tolerating PT/OT     Diarrhea    Tolerating Diet y    Constipation    Other       Could NOT obtain due to:      Objective:     VITALS:   Last 24hrs VS reviewed since prior progress note.  Most recent are:  Patient Vitals for the past 24 hrs:   Temp Pulse Resp BP SpO2   10/17/22 1443 97.8 °F (36.6 °C) 86 16 (!) 161/99 93 %   10/17/22 0746 98.5 °F (36.9 °C) 72 18 (!) 188/85 94 %   10/17/22 0432 98.2 °F (36.8 °C) 79 -- (!) 162/89 98 %   10/16/22 2355 97.4 °F (36.3 °C) 67 18 (!) 174/90 95 % 10/16/22 2013 -- -- -- 134/87 --   10/16/22 1949 98.7 °F (37.1 °C) 89 17 (!) 176/106 97 %   10/16/22 1723 98 °F (36.7 °C) 79 16 (!) 162/87 94 %       Intake/Output Summary (Last 24 hours) at 10/17/2022 1656  Last data filed at 10/16/2022 1925  Gross per 24 hour   Intake 208.75 ml   Output 325 ml   Net -116.25 ml        I had a face to face encounter and independently examined this patient on 10/17/2022, as outlined below:  PHYSICAL EXAM:  General: WD, WN. Alert, cooperative, no acute distress    EENT:  EOMI. Anicteric sclerae. MMM  Resp:  CTA bilaterally, no wheezing or rales. No accessory muscle use  CV:  Regular  rhythm,  No edema  GI:  Soft, Non distended, Non tender. +Bowel sounds  Neurologic:  Alert and oriented X 3, normal speech,   Psych:   Good insight. Not anxious nor agitated  Skin:  No rashes. No jaundice    Reviewed most current lab test results and cultures  YES  Reviewed most current radiology test results   YES  Review and summation of old records today    NO  Reviewed patient's current orders and MAR    YES  PMH/SH reviewed - no change compared to H&P  ________________________________________________________________________  Care Plan discussed with:    Comments   Patient x    Family  x    RN x    Care Manager     Consultant                        Multidiciplinary team rounds were held today with , nursing, pharmacist and clinical coordinator. Patient's plan of care was discussed; medications were reviewed and discharge planning was addressed.      ________________________________________________________________________  Total NON critical care TIME:  35   Minutes    Total CRITICAL CARE TIME Spent:   Minutes non procedure based      Comments   >50% of visit spent in counseling and coordination of care     ________________________________________________________________________  Dory Hahn MD     Procedures: see electronic medical records for all procedures/Xrays and details which were not copied into this note but were reviewed prior to creation of Plan. LABS:  I reviewed today's most current labs and imaging studies.   Pertinent labs include:  Recent Labs     10/17/22  0417 10/15/22  0552   WBC PLEASE DISREGARD RESULTS 8.9   HGB PLEASE DISREGARD RESULTS 12.8   HCT PLEASE DISREGARD RESULTS 38.6   PLT PLEASE DISREGARD RESULTS 163     Recent Labs     10/17/22  1004 10/17/22  0417 10/15/22  0552    PLEASE DISREGARD RESULTS 141   K 3.4* PLEASE DISREGARD RESULTS 3.9    PLEASE DISREGARD RESULTS 109*   CO2 25 PLEASE DISREGARD RESULTS 29   * PLEASE DISREGARD RESULTS 124*   BUN 16 PLEASE DISREGARD RESULTS 16   CREA 0.82 PLEASE DISREGARD RESULTS 1.07   CA 8.2* PLEASE DISREGARD RESULTS 8.1*   MG  --  PLEASE DISREGARD RESULTS  --        Signed: Francoise Charlton MD

## 2022-10-17 NOTE — PROGRESS NOTES
Problem: Falls - Risk of  Goal: *Absence of Falls  Description: Document Tello Andrade Fall Risk and appropriate interventions in the flowsheet.   Outcome: Progressing Towards Goal  Note: Fall Risk Interventions:  Mobility Interventions: OT consult for ADLs, Patient to call before getting OOB, PT Consult for mobility concerns, PT Consult for assist device competence, Strengthening exercises (ROM-active/passive), Utilize walker, cane, or other assistive device         Medication Interventions: Patient to call before getting OOB    Elimination Interventions: Call light in reach              Problem: Patient Education: Go to Patient Education Activity  Goal: Patient/Family Education  Outcome: Progressing Towards Goal     Problem: Pain  Goal: *Control of Pain  Outcome: Progressing Towards Goal     Problem: Patient Education: Go to Patient Education Activity  Goal: Patient/Family Education  Outcome: Progressing Towards Goal

## 2022-10-17 NOTE — PROGRESS NOTES
..End of Shift Note    Bedside shift change report given to Thanh Salazar (oncoming nurse) by Sveta Potst RN (offgoing nurse). Report included the following information SBAR    Shift worked:  9878-2256     Shift summary and any significant changes:     Pt. ambulated in room with walker. Wife at bedside during AM shift. Concerns for physician to address:  no     Zone phone for oncoming shift:        Activity:  Activity Level: Up with Assistance  Number times ambulated in hallways past shift: 0  Number of times OOB to chair past shift: 3    Cardiac:   Cardiac Monitoring: No      Cardiac Rhythm: Sinus Rhythm    Access:  Current line(s): PIV     Genitourinary:   Urinary status: cedeno    Respiratory:   O2 Device: None (Room air)  Chronic home O2 use?: NO  Incentive spirometer at bedside: NO  Actual Volume (ml): 500 ml    GI:  Last Bowel Movement Date: 10/12/22  Current diet:  ADULT DIET Clear Liquid  Passing flatus: YES  Tolerating current diet: YES       Pain Management:   Patient states pain is manageable on current regimen: YES    Skin:  Robert Score: 19  Interventions: increase time out of bed    Patient Safety:  Fall Score:  Total Score: 3  Interventions: gripper socks  High Fall Risk: Yes    Length of Stay:  Expected LOS: 1d 9h  Actual LOS: 5      Sveta Potts RN

## 2022-10-17 NOTE — PROGRESS NOTES
Problem: Falls - Risk of  Goal: *Absence of Falls  Description: Document Romina Andino Fall Risk and appropriate interventions in the flowsheet.   Outcome: Progressing Towards Goal  Note: Fall Risk Interventions:  Mobility Interventions: OT consult for ADLs, Patient to call before getting OOB, PT Consult for mobility concerns, PT Consult for assist device competence, Strengthening exercises (ROM-active/passive), Utilize walker, cane, or other assistive device         Medication Interventions: Patient to call before getting OOB, Teach patient to arise slowly    Elimination Interventions: Call light in reach, Stay With Me (per policy), Toilet paper/wipes in reach, Toileting schedule/hourly rounds              Problem: Patient Education: Go to Patient Education Activity  Goal: Patient/Family Education  Outcome: Progressing Towards Goal     Problem: Pain  Goal: *Control of Pain  Outcome: Progressing Towards Goal     Problem: Patient Education: Go to Patient Education Activity  Goal: Patient/Family Education  Outcome: Progressing Towards Goal

## 2022-10-17 NOTE — PROGRESS NOTES
Progress Note    Patient: Ray Giron MRN: [id number]  SSN: [id number]    YOB: 1950  Age: 73 y.o. Sex: male          ADMITTED:  10/12/2022 to Chalo Gregorio MD  for Prostate cancer [facility] is 5 Days Post-Op Procedure(s) with comments:  ROBOTIC ASSISTED LAPAROSCOPIC PROSTATECTOMY, BILATERAL PELVIC LYMPH NODE DISSECTION, UMBILICAL HERNIA REPAIR - ( E R A S). Passing some gas. Still feels weak. Less distended. NGT out on Saturday and has refused replacement    Suggs draining well. 825ml UOP. Will gently irrigate today  Creat up to 6.6? Question of lab draw error, repeat stat bmp pending  Stat CT abd/p no acute process  AM kub showing improving ileus       Vitals:  Temp (24hrs), Av.1 °F (36.7 °C), Min:97.4 °F (36.3 °C), Max:98.7 °F (37.1 °C)     Blood pressure (!) 188/85, pulse 72, temperature 98.5 °F (36.9 °C), resp. rate 18, height 5' 7.5\" (1.715 m), weight 85 kg (187 lb 6.3 oz), SpO2 94 %. I&O's:  10/15 1901 - 10/17 0700  In: 1821.3 [P.O.:60; I.V.:1761.3]  Out: 1525 [QTTYP:5684]   No intake/output data recorded. Exam:   abdomen soft, appropriately TTP at surgical sites. All incisions clean/dry/intact without evidence of infection. Suggs with clear urine output.      Labs:   Recent Labs     10/17/22  0417 10/15/22  0552   WBC 8.3 8.9   HGB 9.2* 12.8   HCT 29.6* 38.6    163     Recent Labs     10/17/22  0417 10/15/22  0552   * 141   K 5.7* 3.9   CL 96* 109*   CO2 28 29   * 124*   BUN 62* 16   CREA 6.67* 1.07   CA 9.2 8.1*        Cultures:        Imaging:       Assessment:     - 5 Days Post-Op Procedure(s) with comments:  ROBOTIC ASSISTED LAPAROSCOPIC PROSTATECTOMY, BILATERAL PELVIC LYMPH NODE DISSECTION, UMBILICAL HERNIA REPAIR - ( E R A S)    Active Problems:    Prostate cancer ([address] (10/12/2022)        Plan:     - pending repeat BMP  -stat CT reassuring, no acute process  -ileus slowly resolving on kub  -bolus now for hydration   -continue colace, senna    ++repeat BMP with creat NL- lab draw error  Will replete kcl and change IVF  ACHS blood sugar checks   Okay for CLD today as tolerated      Signed By: David Berumen NP - October 17, 2022

## 2022-10-18 VITALS
HEIGHT: 68 IN | OXYGEN SATURATION: 99 % | DIASTOLIC BLOOD PRESSURE: 90 MMHG | HEART RATE: 76 BPM | TEMPERATURE: 98.4 F | WEIGHT: 187.39 LBS | SYSTOLIC BLOOD PRESSURE: 145 MMHG | RESPIRATION RATE: 16 BRPM | BODY MASS INDEX: 28.4 KG/M2

## 2022-10-18 LAB
ANION GAP SERPL CALC-SCNC: 7 MMOL/L (ref 5–15)
BUN SERPL-MCNC: 14 MG/DL (ref 6–20)
BUN/CREAT SERPL: 16 (ref 12–20)
CALCIUM SERPL-MCNC: 8.5 MG/DL (ref 8.5–10.1)
CHLORIDE SERPL-SCNC: 103 MMOL/L (ref 97–108)
CO2 SERPL-SCNC: 25 MMOL/L (ref 21–32)
CREAT SERPL-MCNC: 0.86 MG/DL (ref 0.7–1.3)
ERYTHROCYTE [DISTWIDTH] IN BLOOD BY AUTOMATED COUNT: 13 % (ref 11.5–14.5)
GLUCOSE BLD STRIP.AUTO-MCNC: 109 MG/DL (ref 65–117)
GLUCOSE BLD STRIP.AUTO-MCNC: 120 MG/DL (ref 65–117)
GLUCOSE SERPL-MCNC: 112 MG/DL (ref 65–100)
HCT VFR BLD AUTO: 41.3 % (ref 36.6–50.3)
HGB BLD-MCNC: 13.6 G/DL (ref 12.1–17)
MCH RBC QN AUTO: 30.3 PG (ref 26–34)
MCHC RBC AUTO-ENTMCNC: 32.9 G/DL (ref 30–36.5)
MCV RBC AUTO: 92 FL (ref 80–99)
NRBC # BLD: 0 K/UL (ref 0–0.01)
NRBC BLD-RTO: 0 PER 100 WBC
PLATELET # BLD AUTO: 214 K/UL (ref 150–400)
PMV BLD AUTO: 9.8 FL (ref 8.9–12.9)
POTASSIUM SERPL-SCNC: 3.6 MMOL/L (ref 3.5–5.1)
RBC # BLD AUTO: 4.49 M/UL (ref 4.1–5.7)
SERVICE CMNT-IMP: ABNORMAL
SERVICE CMNT-IMP: NORMAL
SODIUM SERPL-SCNC: 135 MMOL/L (ref 136–145)
WBC # BLD AUTO: 8.7 K/UL (ref 4.1–11.1)

## 2022-10-18 PROCEDURE — 36415 COLL VENOUS BLD VENIPUNCTURE: CPT

## 2022-10-18 PROCEDURE — 80048 BASIC METABOLIC PNL TOTAL CA: CPT

## 2022-10-18 PROCEDURE — 74011000250 HC RX REV CODE- 250: Performed by: UROLOGY

## 2022-10-18 PROCEDURE — 82962 GLUCOSE BLOOD TEST: CPT

## 2022-10-18 PROCEDURE — 85027 COMPLETE CBC AUTOMATED: CPT

## 2022-10-18 PROCEDURE — 74011250636 HC RX REV CODE- 250/636: Performed by: UROLOGY

## 2022-10-18 PROCEDURE — 74011250636 HC RX REV CODE- 250/636: Performed by: INTERNAL MEDICINE

## 2022-10-18 PROCEDURE — 74011250637 HC RX REV CODE- 250/637: Performed by: UROLOGY

## 2022-10-18 RX ADMIN — SODIUM CHLORIDE, PRESERVATIVE FREE 10 ML: 5 INJECTION INTRAVENOUS at 06:33

## 2022-10-18 RX ADMIN — ACETAMINOPHEN 1000 MG: 500 TABLET ORAL at 02:28

## 2022-10-18 RX ADMIN — KETOROLAC TROMETHAMINE 15 MG: 30 INJECTION, SOLUTION INTRAMUSCULAR at 02:43

## 2022-10-18 RX ADMIN — SODIUM CHLORIDE 75 ML/HR: 9 INJECTION, SOLUTION INTRAVENOUS at 02:26

## 2022-10-18 RX ADMIN — ACETAMINOPHEN 1000 MG: 500 TABLET ORAL at 06:33

## 2022-10-18 RX ADMIN — DOCUSATE SODIUM 100 MG: 100 CAPSULE ORAL at 11:19

## 2022-10-18 NOTE — PROGRESS NOTES
Problem: Falls - Risk of  Goal: *Absence of Falls  Description: Document Dorcas Skill Fall Risk and appropriate interventions in the flowsheet.   Outcome: Progressing Towards Goal  Note: Fall Risk Interventions:  Mobility Interventions: OT consult for ADLs, Patient to call before getting OOB, PT Consult for mobility concerns, PT Consult for assist device competence, Strengthening exercises (ROM-active/passive), Utilize walker, cane, or other assistive device         Medication Interventions: Patient to call before getting OOB    Elimination Interventions: Call light in reach              Problem: Patient Education: Go to Patient Education Activity  Goal: Patient/Family Education  Outcome: Progressing Towards Goal     Problem: Pain  Goal: *Control of Pain  Outcome: Progressing Towards Goal     Problem: Patient Education: Go to Patient Education Activity  Goal: Patient/Family Education  Outcome: Progressing Towards Goal

## 2022-10-18 NOTE — PROGRESS NOTES
Hospitalist Progress Note    NAME: Shukri Herrera   :  1950   MRN:  [id number]       Assessment / Plan:  Post operative ileus -   patient now able to pass flatus, benign abdominal examination  Continue serial abdominal exams  KUB showed improved ileus  Allowed regular diet, patient continues to have flatus , did not have  a bowel movement. Continue with bowel regiment  continue to monitor clinical status    Elevated blood pressure without diagnosis of hypertension  Hypokalemia  Systolic blood pressure the 180s, patient has no diagnosis of hypertension  Patient is on IV fluid, will reduce IV fluid rate  As needed hydralazine  Need to be started on BP meds if BP persistently elevated  10/18: BP better controlled    Hx of prostate CA s/p prostatectomy - management as per Urology attending        25.0 - 29.9 Overweight / Body mass index is 28.92 kg/m². Code status: Full  Prophylaxis: SCD's  Recommended Disposition:  TBD      25.0 - 29.9 Overweight / Body mass index is 28.92 kg/m². Estimated discharge date: Primary team   barriers:  Discussed with patient wife at bedside  Code status: Full  Prophylaxis:  Per primary  Recommended Disposition:  PT, OT, RN     Subjective:     Chief Complaint / Reason for Physician Visit  Follow-up ileus, elevated BP discussed with RN events overnight. Feels better than yesterday, able to tolerate diet  Review of Systems:  Symptom Y/N Comments  Symptom Y/N Comments   Fever/Chills    Chest Pain n    Poor Appetite y   Edema     Cough    Abdominal Pain n    Sputum    Joint Pain     SOB/SYLVESTER n   Pruritis/Rash     Nausea/vomit n   Tolerating PT/OT     Diarrhea    Tolerating Diet y    Constipation    Other       Could NOT obtain due to:      Objective:     VITALS:   Last 24hrs VS reviewed since prior progress note.  Most recent are:  Patient Vitals for the past 24 hrs:   Temp Pulse Resp BP SpO2   10/18/22 0746 98.4 °F (36.9 °C) 76 16 (!) 145/90 99 %   10/17/22 1758 -- -- -- (!) 149/96 --   10/17/22 1443 97.8 °F (36.6 °C) 86 16 (!) 161/99 93 %         Intake/Output Summary (Last 24 hours) at 10/18/2022 0906  Last data filed at 10/17/2022 1756  Gross per 24 hour   Intake --   Output 950 ml   Net -950 ml          I had a face to face encounter and independently examined this patient on 10/18/2022, as outlined below:  PHYSICAL EXAM:  General: WD, WN. Alert, cooperative, no acute distress    EENT:  EOMI. Anicteric sclerae. MMM  Resp:  CTA bilaterally, no wheezing or rales. No accessory muscle use  CV:  Regular  rhythm,  No edema  GI:  Soft, Non distended, Non tender. +Bowel sounds  Neurologic:  Alert and oriented X 3, normal speech,   Psych:   Good insight. Not anxious nor agitated  Skin:  No rashes. No jaundice    Reviewed most current lab test results and cultures  YES  Reviewed most current radiology test results   YES  Review and summation of old records today    NO  Reviewed patient's current orders and MAR    YES  PMH/SH reviewed - no change compared to H&P  ________________________________________________________________________  Care Plan discussed with:    Comments   Patient x    Family  x    RN x    Care Manager     Consultant                        Multidiciplinary team rounds were held today with , nursing, pharmacist and clinical coordinator. Patient's plan of care was discussed; medications were reviewed and discharge planning was addressed.      ________________________________________________________________________  Total NON critical care TIME:  35   Minutes    Total CRITICAL CARE TIME Spent:   Minutes non procedure based      Comments   >50% of visit spent in counseling and coordination of care     ________________________________________________________________________  Maribel Singh MD     Procedures: see electronic medical records for all procedures/Xrays and details which were not copied into this note but were reviewed prior to creation of Plan.      LABS:  I reviewed today's most current labs and imaging studies.   Pertinent labs include:  Recent Labs     10/18/22  0417 10/17/22  0417   WBC 8.7 PLEASE DISREGARD RESULTS   HGB 13.6 PLEASE DISREGARD RESULTS   HCT 41.3 PLEASE DISREGARD RESULTS    PLEASE DISREGARD RESULTS       Recent Labs     10/18/22  0417 10/17/22  1004 10/17/22  0417   * 138 PLEASE DISREGARD RESULTS   K 3.6 3.4* PLEASE DISREGARD RESULTS    106 PLEASE DISREGARD RESULTS   CO2 25 25 PLEASE DISREGARD RESULTS   * 277* PLEASE DISREGARD RESULTS   BUN 14 16 PLEASE DISREGARD RESULTS   CREA 0.86 0.82 PLEASE DISREGARD RESULTS   CA 8.5 8.2* PLEASE DISREGARD RESULTS   MG  --   --  PLEASE DISREGARD RESULTS         Signed: Colton Dixon MD

## 2022-10-18 NOTE — PROGRESS NOTES
Progress Note    Patient: Allegra Segura MRN: [id number]  SSN: [id number]    YOB: 1950  Age: 73 y.o. Sex: male          ADMITTED:  10/12/2022 to Shanelle Hernandez MD  for Prostate cancer [facility]) Liset Vargas is 6 Days Post-Op Procedure(s) with comments:  ROBOTIC ASSISTED LAPAROSCOPIC PROSTATECTOMY, BILATERAL PELVIC LYMPH NODE DISSECTION, UMBILICAL HERNIA REPAIR - ( E R A S). He is doing well. He has no pain. He has no nausea. He is tolerating clear liquids. Indwelling catheter is draining well. Weakness improved since yesterday, ambulating hallways. Labs remain stable. He is passing gas. Tolerating PO. Motivated for discharge. BP stable         Vitals:  Temp (24hrs), Av.1 °F (36.7 °C), Min:97.8 °F (36.6 °C), Max:98.4 °F (36.9 °C)     Blood pressure (!) 145/90, pulse 76, temperature 98.4 °F (36.9 °C), resp. rate 16, height 5' 7.5\" (1.715 m), weight 85 kg (187 lb 6.3 oz), SpO2 99 %. I&O's:  10/16 1901 - 10/18 0700  In: -   Out: 1075 [Urine:1075]   No intake/output data recorded. Exam:   abdomen soft, appropriately TTP at surgical sites. All incisions clean/dry/intact without evidence of infection. Suggs with clear urine output.      Labs:   Recent Labs     10/18/22  0417 10/17/22  0417   WBC 8.7 PLEASE DISREGARD RESULTS   HGB 13.6 PLEASE DISREGARD RESULTS   HCT 41.3 PLEASE DISREGARD RESULTS    PLEASE DISREGARD RESULTS     Recent Labs     10/18/22  0417 10/17/22  1004 10/17/22  0417   * 138 PLEASE DISREGARD RESULTS   K 3.6 3.4* PLEASE DISREGARD RESULTS    106 PLEASE DISREGARD RESULTS   CO2 25 25 PLEASE DISREGARD RESULTS   * 277* PLEASE DISREGARD RESULTS   BUN 14 16 PLEASE DISREGARD RESULTS   CREA 0.86 0.82 PLEASE DISREGARD RESULTS   CA 8.5 8.2* PLEASE DISREGARD RESULTS        Cultures:        Imaging:       Assessment:     - 6 Days Post-Op Procedure(s) with comments:  ROBOTIC ASSISTED LAPAROSCOPIC PROSTATECTOMY, BILATERAL PELVIC LYMPH NODE DISSECTION, UMBILICAL HERNIA REPAIR - ( E R A S)    Active Problems:    Prostate cancer ([facility] [address] (10/12/2022)        Plan:     - anticipate discharge home later today with cedeno   -follow up as scheduled this Friday to discuss pathology   -can advance diet today, GI bland for home until [facility]    Discussed with supervising MD, Dr. Sherl Meigs By: Shae Carroll NP - October 18, 2022

## 2022-10-18 NOTE — DISCHARGE SUMMARY
Urology Discharge Summary    Patient: Jethro Wilde MRN: [id number]  SSN: [id number]    YOB: 1950  Age: 73 y.o. Sex: male               ADMISSION:  to Markel Ashton MD by Laura Azar MD  10/12/2022 ADMISSION DIAGNOSIS: Prostate cancer [facility]) Mihir Mustafa  10/18/2022 DISCHARGE DIAGNOSIS: [x]    Same  CONSULTS: None  PROCEDURES: POD# 6 Days Post-Op Procedure(s):  ROBOTIC ASSISTED LAPAROSCOPIC PROSTATECTOMY, BILATERAL PELVIC LYMPH NODE DISSECTION, UMBILICAL HERNIA REPAIR    RECENT LABS: Temp (24hrs), Av.1 °F (36.7 °C), Min:97.8 °F (36.6 °C), Max:98.4 °F (36.9 °C)    No results found for requested labs within last 720 hours. Recent Labs     10/18/22  0417 10/17/22  1004 10/17/22  0417   WBC 8.7  --  PLEASE DISREGARD RESULTS   BUN 14 16 PLEASE DISREGARD RESULTS   CREA 0.86 0.82 PLEASE DISREGARD RESULTS        HOSPITAL COURSE: [x]    Uncomplicated. COMPLICATIONS: [x]    None identified  DISCHARGE TO:     [x]    Home  []    Rehab []    SNF  FOLLOWUP: one week  DISCHARGE MEDS:     [x]    IT IS INTENDED THAT YOU CONTINUE TO TAKE YOUR PRIOR MEDICATIONS WITHOUT CHANGES WITH THE EXCEPTION OF:  []    NONE    Current Discharge Medication List        START taking these medications    Details   docusate sodium (Colace) 100 mg capsule Take 1 Capsule by mouth two (2) times a day for 90 days. Qty: 60 Capsule, Refills: 2  Start date: 10/12/2022, End date: 1/10/2023           CONTINUE these medications which have NOT CHANGED    Details   multivit-min/folic/vit K/lycop (MEN'S MULTIVITAMIN PO) Take 1 Tablet by mouth daily. MV-Min-Vit C-Glut-Lysine-Hb124 (Airborne, lysine HCl,) 1,000-50 mg tbef Take 1 Tablet by mouth daily. cholecalciferol, vitamin D3, (VITAMIN D3 PO) Take 1 Gum by mouth daily. ascorbic acid (VITAMIN C PO) Take 1 Tablet by mouth daily.            STOP taking these medications       HYDROcodone-acetaminophen (Norco) 5-325 mg per tablet Comments:   Reason for Stopping: ciprofloxacin HCl (Cipro) 500 mg tablet Comments:   Reason for Stopping:         aspirin delayed-release 81 mg tablet Comments:   Reason for Stopping:                  Mario Araya NP 10/18/2022 1:31 PM

## 2022-10-20 NOTE — PROGRESS NOTES
Physician Progress Note      PATIENT:               Aislinn Wright [facility]  CSN #:                  [id number]  :                       1950  ADMIT DATE:       10/12/2022 6:13 AM  DISCH DATE:        10/18/2022 4:08 PM  RESPONDING  PROVIDER #:        Ruddy Yepez NP          QUERY TEXT:    Pt admitted with prostate CA and underwent prostatectomy, noted documentation of post operative ileus (10/16 Dr. Marianela Bess pn). If possible, please document in progress notes and discharge summary the relationship if any between the ileus and the surgery: The medical record reflects the following:  Risk Factors: 73year old male s/p prostatectomy  Clinical Indicators: KUB 10/15: Ileus  Treatment: KUB, NPO, NG tube    Thank You,  Hany Cintron RN, CDI  Options provided:  -- Ileus?is due to the procedure  -- ileus is not due to the procedure, but is due to other incidental risk factor, Please specify other incidental risk factor. -- Other - I will add my own diagnosis  -- Disagree - Not applicable / Not valid  -- Disagree - Clinically unable to determine / Unknown  -- Refer to Clinical Documentation Reviewer    PROVIDER RESPONSE TEXT:    Provider disagreed with this query.   pt does not have an ileus    Query created by: Freda Berger on 10/18/2022 2:56 PM      Electronically signed by:  Ruddy Yepez NP 10/20/2022 2:44 PM

## 2023-07-25 ENCOUNTER — OFFICE VISIT (OUTPATIENT)
Age: 73
End: 2023-07-25
Payer: MEDICARE

## 2023-07-25 VITALS
DIASTOLIC BLOOD PRESSURE: 80 MMHG | HEART RATE: 72 BPM | BODY MASS INDEX: 28.67 KG/M2 | TEMPERATURE: 97.7 F | OXYGEN SATURATION: 93 % | HEIGHT: 68 IN | SYSTOLIC BLOOD PRESSURE: 125 MMHG | WEIGHT: 189.2 LBS | RESPIRATION RATE: 16 BRPM

## 2023-07-25 DIAGNOSIS — K43.2 RECURRENT INCISIONAL HERNIA: Primary | ICD-10-CM

## 2023-07-25 PROCEDURE — G8427 DOCREV CUR MEDS BY ELIG CLIN: HCPCS | Performed by: SURGERY

## 2023-07-25 PROCEDURE — 1123F ACP DISCUSS/DSCN MKR DOCD: CPT | Performed by: SURGERY

## 2023-07-25 PROCEDURE — 1036F TOBACCO NON-USER: CPT | Performed by: SURGERY

## 2023-07-25 PROCEDURE — G8419 CALC BMI OUT NRM PARAM NOF/U: HCPCS | Performed by: SURGERY

## 2023-07-25 PROCEDURE — 99204 OFFICE O/P NEW MOD 45 MIN: CPT | Performed by: SURGERY

## 2023-07-25 PROCEDURE — 3017F COLORECTAL CA SCREEN DOC REV: CPT | Performed by: SURGERY

## 2023-07-25 RX ORDER — ASPIRIN 81 MG/1
81 TABLET, CHEWABLE ORAL DAILY
COMMUNITY

## 2023-07-25 RX ORDER — TURMERIC 400 MG
CAPSULE ORAL
COMMUNITY

## 2023-07-25 RX ORDER — OMEGA-3S/DHA/EPA/FISH OIL/D3 300MG-1000
400 CAPSULE ORAL DAILY
COMMUNITY

## 2023-07-25 RX ORDER — CHLORAL HYDRATE 500 MG
CAPSULE ORAL
COMMUNITY

## 2023-07-25 RX ORDER — MAGNESIUM 30 MG
30 TABLET ORAL 2 TIMES DAILY
COMMUNITY

## 2023-07-25 RX ORDER — VIT C/HESPERIDIN/BIOFLAVONOIDS 500-100 MG
TABLET ORAL
COMMUNITY

## 2023-07-25 ASSESSMENT — PATIENT HEALTH QUESTIONNAIRE - PHQ9
SUM OF ALL RESPONSES TO PHQ QUESTIONS 1-9: 0
1. LITTLE INTEREST OR PLEASURE IN DOING THINGS: 0
2. FEELING DOWN, DEPRESSED OR HOPELESS: 0
SUM OF ALL RESPONSES TO PHQ9 QUESTIONS 1 & 2: 0
SUM OF ALL RESPONSES TO PHQ QUESTIONS 1-9: 0

## 2023-07-25 ASSESSMENT — ENCOUNTER SYMPTOMS
SORE THROAT: 0
COUGH: 0
WHEEZING: 0
BLOOD IN STOOL: 0
DIARRHEA: 0
SHORTNESS OF BREATH: 0
VOMITING: 0
NAUSEA: 0
STRIDOR: 0
EYE PAIN: 0
ABDOMINAL PAIN: 1
CONSTIPATION: 0
BACK PAIN: 0

## 2023-07-25 NOTE — PROGRESS NOTES
Subjective:      Patient ID: Volodymyr Heredia is a 81 y.o. male who comes in for consultation by Duane Barre, MD for a possible hernia      Chief Complaint   Patient presents with    New Patient     Seen at the request of Dr. Stevie Caceres for the evaluation of a possible umbilical hernia. HPI  He had prostate cancer in the fall of . He has a ventral hernia at the time. Dr Charmaine López did a RA prostatectomy and repaired the hernia primarily 10/12/2022. 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 The swelling continues to get larger and is painful at times. He also has urinary incontinence and is to start PT for that. Past Medical History:   Diagnosis Date    Arthritis     BPH (benign prostatic hyperplasia)     Prostate cancer ([address])     Umbilical hernia      Past Surgical History:   Procedure Laterality Date    COLONOSCOPY      TONSILLECTOMY       Family History   Problem Relation Age of Onset    Diabetes Paternal Grandmother     Heart Attack Paternal Grandmother     Stroke Paternal Grandmother      Social History     Tobacco Use    Smoking status: Former     Types: Cigarettes     Quit date: 1977     Years since quittin.5    Smokeless tobacco: Never   Vaping Use    Vaping Use: Never used   Substance Use Topics    Alcohol use: Not Currently    Drug use: Never     Current Outpatient Medications   Medication Sig    Multiple Vitamins-Minerals (AIRBORNE PO) Take by mouth    vitamin D3 (CHOLECALCIFEROL) 10 MCG (400 UNIT) TABS tablet Take 1 tablet by mouth daily    Multiple Vitamins-Minerals (MULTIVITAL PO) Take by mouth    Omega-3 1000 MG CAPS Take by mouth    Turmeric 400 MG CAPS Take by mouth    Zinc 30 MG TABS Take by mouth    magnesium 30 MG tablet Take 1 tablet by mouth 2 times daily    aspirin 81 MG chewable tablet Take 1 tablet by mouth daily     No current facility-administered medications for this visit.      Allergies

## 2023-08-28 RX ORDER — ASCORBIC ACID 500 MG
500 TABLET ORAL DAILY
COMMUNITY

## 2023-08-28 NOTE — PERIOP NOTE
Faxed ELSY Stop Bang score of 3 to patients primary provider Dr. Jalen Glez requesting follow up with patient or additional recommendations regarding sleep apnea evaluation; fax confirmation received.

## 2023-08-31 ENCOUNTER — ANESTHESIA (OUTPATIENT)
Facility: HOSPITAL | Age: 73
End: 2023-08-31
Payer: MEDICARE

## 2023-08-31 ENCOUNTER — HOSPITAL ENCOUNTER (OUTPATIENT)
Facility: HOSPITAL | Age: 73
Setting detail: OBSERVATION
Discharge: HOME | End: 2023-09-01
Attending: SURGERY | Admitting: STUDENT IN AN ORGANIZED HEALTH CARE EDUCATION/TRAINING PROGRAM
Payer: MEDICARE

## 2023-08-31 ENCOUNTER — ANESTHESIA EVENT (OUTPATIENT)
Facility: HOSPITAL | Age: 73
End: 2023-08-31
Payer: MEDICARE

## 2023-08-31 ENCOUNTER — APPOINTMENT (OUTPATIENT)
Facility: HOSPITAL | Age: 73
End: 2023-08-31
Attending: INTERNAL MEDICINE
Payer: MEDICARE

## 2023-08-31 DIAGNOSIS — I49.3 PVC'S (PREMATURE VENTRICULAR CONTRACTIONS): Primary | ICD-10-CM

## 2023-08-31 PROBLEM — I49.8 BIGEMINY: Status: ACTIVE | Noted: 2023-08-31

## 2023-08-31 LAB
ALBUMIN SERPL-MCNC: 3.5 G/DL (ref 3.5–5)
ALBUMIN/GLOB SERPL: 1 (ref 1.1–2.2)
ALP SERPL-CCNC: 95 U/L (ref 45–117)
ALT SERPL-CCNC: 26 U/L (ref 12–78)
ANION GAP SERPL CALC-SCNC: 2 MMOL/L (ref 5–15)
AST SERPL-CCNC: 18 U/L (ref 15–37)
BILIRUB SERPL-MCNC: 0.7 MG/DL (ref 0.2–1)
BUN SERPL-MCNC: 19 MG/DL (ref 6–20)
BUN/CREAT SERPL: 17 (ref 12–20)
CALCIUM SERPL-MCNC: 8.7 MG/DL (ref 8.5–10.1)
CHLORIDE SERPL-SCNC: 108 MMOL/L (ref 97–108)
CO2 SERPL-SCNC: 30 MMOL/L (ref 21–32)
CREAT SERPL-MCNC: 1.14 MG/DL (ref 0.7–1.3)
ECHO AV AREA PEAK VELOCITY: 0 CM2
ECHO AV AREA PEAK VELOCITY: 2.8 CM2
ECHO AV AREA VTI: 2.5 CM2
ECHO AV AREA/BSA VTI: 1.3 CM2/M2
ECHO AV MEAN GRADIENT: 5 MMHG
ECHO AV MEAN VELOCITY: 1.1 M/S
ECHO AV PEAK GRADIENT: 11 MMHG
ECHO AV PEAK VELOCITY: 1.6 M/S
ECHO AV VTI: 36.9 CM
ECHO BSA: 2.03 M2
ECHO LA DIAMETER INDEX: 2.15 CM/M2
ECHO LA DIAMETER: 4.3 CM
ECHO LA VOL 2C: 88 ML (ref 18–58)
ECHO LA VOL 2C: 93 ML (ref 18–58)
ECHO LA VOL 4C: 60 ML (ref 18–58)
ECHO LA VOL 4C: 62 ML (ref 18–58)
ECHO LA VOLUME AREA LENGTH: 83 ML
ECHO LA VOLUME INDEX AREA LENGTH: 42 ML/M2 (ref 16–34)
ECHO LV E' LATERAL VELOCITY: 8 CM/S
ECHO LV E' SEPTAL VELOCITY: 6 CM/S
ECHO LV EDV A4C: 108 ML
ECHO LV EDV INDEX A4C: 54 ML/M2
ECHO LV EJECTION FRACTION A4C: 62 %
ECHO LV ESV A4C: 41 ML
ECHO LV ESV INDEX A4C: 21 ML/M2
ECHO LV FRACTIONAL SHORTENING: 33 % (ref 28–44)
ECHO LV INTERNAL DIMENSION DIASTOLE INDEX: 2.55 CM/M2
ECHO LV INTERNAL DIMENSION DIASTOLIC: 5.1 CM (ref 4.2–5.9)
ECHO LV INTERNAL DIMENSION SYSTOLIC INDEX: 1.7 CM/M2
ECHO LV INTERNAL DIMENSION SYSTOLIC: 3.4 CM
ECHO LV IVSD: 0.9 CM (ref 0.6–1)
ECHO LV MASS 2D: 163.6 G (ref 88–224)
ECHO LV MASS INDEX 2D: 81.8 G/M2 (ref 49–115)
ECHO LV POSTERIOR WALL DIASTOLIC: 0.9 CM (ref 0.6–1)
ECHO LV RELATIVE WALL THICKNESS RATIO: 0.35
ECHO LVOT AREA: 3.5 CM2
ECHO LVOT AV VTI INDEX: 0.73
ECHO LVOT DIAM: 2.1 CM
ECHO LVOT MEAN GRADIENT: 3 MMHG
ECHO LVOT PEAK GRADIENT: 0 MMHG
ECHO LVOT PEAK GRADIENT: 7 MMHG
ECHO LVOT PEAK VELOCITY: 0 M/S
ECHO LVOT PEAK VELOCITY: 1.3 M/S
ECHO LVOT STROKE VOLUME INDEX: 46.6 ML/M2
ECHO LVOT SV: 93.1 ML
ECHO LVOT VTI: 26.9 CM
ECHO MV A VELOCITY: 1.26 M/S
ECHO MV AREA VTI: 2.8 CM2
ECHO MV E DECELERATION TIME (DT): 234.5 MS
ECHO MV E VELOCITY: 0.89 M/S
ECHO MV E/A RATIO: 0.71
ECHO MV E/E' LATERAL: 11.13
ECHO MV E/E' RATIO (AVERAGED): 12.98
ECHO MV E/E' SEPTAL: 14.83
ECHO MV LVOT VTI INDEX: 1.23
ECHO MV MAX VELOCITY: 1.4 M/S
ECHO MV MEAN GRADIENT: 2 MMHG
ECHO MV MEAN VELOCITY: 0.7 M/S
ECHO MV PEAK GRADIENT: 7 MMHG
ECHO MV VTI: 33 CM
ECHO PULMONARY ARTERY END DIASTOLIC PRESSURE: 5 MMHG
ECHO PV REGURGITANT MAX VELOCITY: 1.2 M/S
ECHO RV TAPSE: 2.4 CM (ref 1.7–?)
ECHO TV REGURGITANT MAX VELOCITY: 1.87 M/S
ECHO TV REGURGITANT PEAK GRADIENT: 14 MMHG
ERYTHROCYTE [DISTWIDTH] IN BLOOD BY AUTOMATED COUNT: 12.3 % (ref 11.5–14.5)
GLOBULIN SER CALC-MCNC: 3.4 G/DL (ref 2–4)
GLUCOSE SERPL-MCNC: 108 MG/DL (ref 65–100)
HCT VFR BLD AUTO: 43.6 % (ref 36.6–50.3)
HGB BLD-MCNC: 14.9 G/DL (ref 12.1–17)
MAGNESIUM SERPL-MCNC: 2.2 MG/DL (ref 1.6–2.4)
MCH RBC QN AUTO: 31.4 PG (ref 26–34)
MCHC RBC AUTO-ENTMCNC: 34.2 G/DL (ref 30–36.5)
MCV RBC AUTO: 91.8 FL (ref 80–99)
NRBC # BLD: 0 K/UL (ref 0–0.01)
NRBC BLD-RTO: 0 PER 100 WBC
PLATELET # BLD AUTO: 142 K/UL (ref 150–400)
PMV BLD AUTO: 11.1 FL (ref 8.9–12.9)
POTASSIUM SERPL-SCNC: 4.1 MMOL/L (ref 3.5–5.1)
PROT SERPL-MCNC: 6.9 G/DL (ref 6.4–8.2)
RBC # BLD AUTO: 4.75 M/UL (ref 4.1–5.7)
SODIUM SERPL-SCNC: 140 MMOL/L (ref 136–145)
WBC # BLD AUTO: 6.2 K/UL (ref 4.1–11.1)

## 2023-08-31 PROCEDURE — 6360000002 HC RX W HCPCS: Performed by: INTERNAL MEDICINE

## 2023-08-31 PROCEDURE — 6370000000 HC RX 637 (ALT 250 FOR IP): Performed by: INTERNAL MEDICINE

## 2023-08-31 PROCEDURE — G0378 HOSPITAL OBSERVATION PER HR: HCPCS

## 2023-08-31 PROCEDURE — 85027 COMPLETE CBC AUTOMATED: CPT

## 2023-08-31 PROCEDURE — 93306 TTE W/DOPPLER COMPLETE: CPT

## 2023-08-31 PROCEDURE — 2580000003 HC RX 258: Performed by: ANESTHESIOLOGY

## 2023-08-31 PROCEDURE — 2580000003 HC RX 258: Performed by: STUDENT IN AN ORGANIZED HEALTH CARE EDUCATION/TRAINING PROGRAM

## 2023-08-31 PROCEDURE — 80053 COMPREHEN METABOLIC PANEL: CPT

## 2023-08-31 PROCEDURE — 83735 ASSAY OF MAGNESIUM: CPT

## 2023-08-31 PROCEDURE — 36415 COLL VENOUS BLD VENIPUNCTURE: CPT

## 2023-08-31 RX ORDER — HYDROMORPHONE HYDROCHLORIDE 1 MG/ML
0.5 INJECTION, SOLUTION INTRAMUSCULAR; INTRAVENOUS; SUBCUTANEOUS EVERY 5 MIN PRN
Status: CANCELLED | OUTPATIENT
Start: 2023-08-31

## 2023-08-31 RX ORDER — DIPHENHYDRAMINE HYDROCHLORIDE 50 MG/ML
12.5 INJECTION INTRAMUSCULAR; INTRAVENOUS
Status: CANCELLED | OUTPATIENT
Start: 2023-08-31 | End: 2023-09-01

## 2023-08-31 RX ORDER — SODIUM CHLORIDE 0.9 % (FLUSH) 0.9 %
5-40 SYRINGE (ML) INJECTION PRN
Status: CANCELLED | OUTPATIENT
Start: 2023-08-31

## 2023-08-31 RX ORDER — SODIUM CHLORIDE, SODIUM LACTATE, POTASSIUM CHLORIDE, CALCIUM CHLORIDE 600; 310; 30; 20 MG/100ML; MG/100ML; MG/100ML; MG/100ML
INJECTION, SOLUTION INTRAVENOUS ONCE
Status: COMPLETED | OUTPATIENT
Start: 2023-08-31 | End: 2023-08-31

## 2023-08-31 RX ORDER — DEXAMETHASONE SODIUM PHOSPHATE 4 MG/ML
4 INJECTION, SOLUTION INTRA-ARTICULAR; INTRALESIONAL; INTRAMUSCULAR; INTRAVENOUS; SOFT TISSUE
Status: CANCELLED | OUTPATIENT
Start: 2023-08-31 | End: 2023-09-01

## 2023-08-31 RX ORDER — SODIUM CHLORIDE 0.9 % (FLUSH) 0.9 %
5-40 SYRINGE (ML) INJECTION EVERY 12 HOURS SCHEDULED
Status: CANCELLED | OUTPATIENT
Start: 2023-08-31

## 2023-08-31 RX ORDER — SODIUM CHLORIDE, SODIUM LACTATE, POTASSIUM CHLORIDE, CALCIUM CHLORIDE 600; 310; 30; 20 MG/100ML; MG/100ML; MG/100ML; MG/100ML
INJECTION, SOLUTION INTRAVENOUS CONTINUOUS
Status: DISCONTINUED | OUTPATIENT
Start: 2023-08-31 | End: 2023-09-01 | Stop reason: HOSPADM

## 2023-08-31 RX ORDER — HYDRALAZINE HYDROCHLORIDE 20 MG/ML
10 INJECTION INTRAMUSCULAR; INTRAVENOUS
Status: CANCELLED | OUTPATIENT
Start: 2023-08-31

## 2023-08-31 RX ORDER — ONDANSETRON 4 MG/1
4 TABLET, ORALLY DISINTEGRATING ORAL EVERY 8 HOURS PRN
Status: DISCONTINUED | OUTPATIENT
Start: 2023-08-31 | End: 2023-09-01 | Stop reason: HOSPADM

## 2023-08-31 RX ORDER — MAGNESIUM SULFATE IN WATER 40 MG/ML
2000 INJECTION, SOLUTION INTRAVENOUS ONCE
Status: COMPLETED | OUTPATIENT
Start: 2023-08-31 | End: 2023-08-31

## 2023-08-31 RX ORDER — SODIUM CHLORIDE 9 MG/ML
INJECTION, SOLUTION INTRAVENOUS PRN
Status: DISCONTINUED | OUTPATIENT
Start: 2023-08-31 | End: 2023-08-31 | Stop reason: HOSPADM

## 2023-08-31 RX ORDER — SODIUM CHLORIDE 0.9 % (FLUSH) 0.9 %
5-40 SYRINGE (ML) INJECTION PRN
Status: DISCONTINUED | OUTPATIENT
Start: 2023-08-31 | End: 2023-08-31 | Stop reason: HOSPADM

## 2023-08-31 RX ORDER — ENOXAPARIN SODIUM 100 MG/ML
40 INJECTION SUBCUTANEOUS DAILY
Status: DISCONTINUED | OUTPATIENT
Start: 2023-08-31 | End: 2023-09-01 | Stop reason: HOSPADM

## 2023-08-31 RX ORDER — SODIUM CHLORIDE 0.9 % (FLUSH) 0.9 %
5-40 SYRINGE (ML) INJECTION PRN
Status: DISCONTINUED | OUTPATIENT
Start: 2023-08-31 | End: 2023-09-01 | Stop reason: HOSPADM

## 2023-08-31 RX ORDER — ACETAMINOPHEN 500 MG
1000 TABLET ORAL ONCE
Status: DISCONTINUED | OUTPATIENT
Start: 2023-08-31 | End: 2023-08-31 | Stop reason: HOSPADM

## 2023-08-31 RX ORDER — PROCHLORPERAZINE EDISYLATE 5 MG/ML
5 INJECTION INTRAMUSCULAR; INTRAVENOUS
Status: CANCELLED | OUTPATIENT
Start: 2023-08-31 | End: 2023-09-01

## 2023-08-31 RX ORDER — ACETAMINOPHEN 325 MG/1
650 TABLET ORAL EVERY 6 HOURS PRN
Status: DISCONTINUED | OUTPATIENT
Start: 2023-08-31 | End: 2023-09-01 | Stop reason: HOSPADM

## 2023-08-31 RX ORDER — FENTANYL CITRATE 50 UG/ML
100 INJECTION, SOLUTION INTRAMUSCULAR; INTRAVENOUS
Status: DISCONTINUED | OUTPATIENT
Start: 2023-08-31 | End: 2023-08-31 | Stop reason: HOSPADM

## 2023-08-31 RX ORDER — MIDAZOLAM HYDROCHLORIDE 2 MG/2ML
2 INJECTION, SOLUTION INTRAMUSCULAR; INTRAVENOUS
Status: DISCONTINUED | OUTPATIENT
Start: 2023-08-31 | End: 2023-08-31 | Stop reason: HOSPADM

## 2023-08-31 RX ORDER — METOPROLOL TARTRATE 50 MG/1
50 TABLET, FILM COATED ORAL 2 TIMES DAILY
Status: DISCONTINUED | OUTPATIENT
Start: 2023-08-31 | End: 2023-09-01 | Stop reason: HOSPADM

## 2023-08-31 RX ORDER — SODIUM CHLORIDE 9 MG/ML
INJECTION, SOLUTION INTRAVENOUS PRN
Status: DISCONTINUED | OUTPATIENT
Start: 2023-08-31 | End: 2023-09-01 | Stop reason: HOSPADM

## 2023-08-31 RX ORDER — POLYETHYLENE GLYCOL 3350 17 G/17G
17 POWDER, FOR SOLUTION ORAL DAILY PRN
Status: DISCONTINUED | OUTPATIENT
Start: 2023-08-31 | End: 2023-09-01 | Stop reason: HOSPADM

## 2023-08-31 RX ORDER — ONDANSETRON 2 MG/ML
4 INJECTION INTRAMUSCULAR; INTRAVENOUS ONCE
Status: DISCONTINUED | OUTPATIENT
Start: 2023-08-31 | End: 2023-08-31 | Stop reason: HOSPADM

## 2023-08-31 RX ORDER — SODIUM CHLORIDE 0.9 % (FLUSH) 0.9 %
5-40 SYRINGE (ML) INJECTION EVERY 12 HOURS SCHEDULED
Status: DISCONTINUED | OUTPATIENT
Start: 2023-08-31 | End: 2023-08-31 | Stop reason: HOSPADM

## 2023-08-31 RX ORDER — ACETAMINOPHEN 650 MG/1
650 SUPPOSITORY RECTAL EVERY 6 HOURS PRN
Status: DISCONTINUED | OUTPATIENT
Start: 2023-08-31 | End: 2023-09-01 | Stop reason: HOSPADM

## 2023-08-31 RX ORDER — MEPERIDINE HYDROCHLORIDE 25 MG/ML
12.5 INJECTION INTRAMUSCULAR; INTRAVENOUS; SUBCUTANEOUS EVERY 5 MIN PRN
Status: CANCELLED | OUTPATIENT
Start: 2023-08-31

## 2023-08-31 RX ORDER — ONDANSETRON 2 MG/ML
4 INJECTION INTRAMUSCULAR; INTRAVENOUS EVERY 6 HOURS PRN
Status: DISCONTINUED | OUTPATIENT
Start: 2023-08-31 | End: 2023-09-01 | Stop reason: HOSPADM

## 2023-08-31 RX ORDER — HYDRALAZINE HYDROCHLORIDE 20 MG/ML
5 INJECTION INTRAMUSCULAR; INTRAVENOUS EVERY 6 HOURS PRN
Status: DISCONTINUED | OUTPATIENT
Start: 2023-08-31 | End: 2023-09-01 | Stop reason: HOSPADM

## 2023-08-31 RX ORDER — OXYCODONE HYDROCHLORIDE 5 MG/1
10 TABLET ORAL PRN
Status: CANCELLED | OUTPATIENT
Start: 2023-08-31 | End: 2023-08-31

## 2023-08-31 RX ORDER — SODIUM CHLORIDE 9 MG/ML
INJECTION, SOLUTION INTRAVENOUS PRN
Status: CANCELLED | OUTPATIENT
Start: 2023-08-31

## 2023-08-31 RX ORDER — SODIUM CHLORIDE 0.9 % (FLUSH) 0.9 %
5-40 SYRINGE (ML) INJECTION EVERY 12 HOURS SCHEDULED
Status: DISCONTINUED | OUTPATIENT
Start: 2023-08-31 | End: 2023-09-01 | Stop reason: HOSPADM

## 2023-08-31 RX ORDER — OXYCODONE HYDROCHLORIDE 5 MG/1
5 TABLET ORAL PRN
Status: CANCELLED | OUTPATIENT
Start: 2023-08-31 | End: 2023-08-31

## 2023-08-31 RX ORDER — FENTANYL CITRATE 50 UG/ML
25 INJECTION, SOLUTION INTRAMUSCULAR; INTRAVENOUS EVERY 5 MIN PRN
Status: CANCELLED | OUTPATIENT
Start: 2023-08-31

## 2023-08-31 RX ORDER — MIDAZOLAM HYDROCHLORIDE 1 MG/ML
2 INJECTION, SOLUTION INTRAMUSCULAR; INTRAVENOUS
Status: CANCELLED | OUTPATIENT
Start: 2023-08-31 | End: 2023-09-01

## 2023-08-31 RX ADMIN — MAGNESIUM SULFATE HEPTAHYDRATE 2000 MG: 40 INJECTION, SOLUTION INTRAVENOUS at 10:44

## 2023-08-31 RX ADMIN — METOPROLOL TARTRATE 25 MG: 25 TABLET, FILM COATED ORAL at 11:40

## 2023-08-31 RX ADMIN — SODIUM CHLORIDE, POTASSIUM CHLORIDE, SODIUM LACTATE AND CALCIUM CHLORIDE: 600; 310; 30; 20 INJECTION, SOLUTION INTRAVENOUS at 09:00

## 2023-08-31 RX ADMIN — METOPROLOL TARTRATE 50 MG: 50 TABLET ORAL at 20:35

## 2023-08-31 RX ADMIN — SODIUM CHLORIDE, PRESERVATIVE FREE 10 ML: 5 INJECTION INTRAVENOUS at 20:36

## 2023-08-31 ASSESSMENT — PAIN SCALES - GENERAL
PAINLEVEL_OUTOF10: 0
PAINLEVEL_OUTOF10: 0

## 2023-08-31 NOTE — H&P
Hospitalist Admission Note    NAME:   Bharati Cook   : 1950   MRN: [id number]     Date/Time: 2023 4:00 PM    Patient PCP: Shaniqua Szymanski MD    ______________________________________________________________________  Given the patient's current clinical presentation, I have a high level of concern for decompensation if discharged from the emergency department. Complex decision making was performed, which includes reviewing the patient's available past medical records, laboratory results, and x-ray films. My assessment of this patient's clinical condition and my plan of care is as follows. Assessment / Plan:    Bigeminy  Admit to telemetry  EKG shows PVCs  We will get echo  Cardio following appreciate their input  Continue with metoprolol 25 Mg twice daily  Electrolytes within normal limit  We will check mag level  Repeat BMP tomorrow      Hypertension  Not a medication at home  Hydralazine as needed  Patient was started on metoprolol    History of prostatectomy  Pain medication as needed  Continue outpatient urology follow-up    Abdominal incisional hernia  Surgery consult  Outpatient follow-up      Medical Decision Making:   I personally reviewed labs: CBC, BMP  I personally reviewed imaging:yes  I personally reviewed EKG:  Toxic drug monitoring: yes  Discussed case with: ED provider. After discussion I am in agreement that acuity of patient's medical condition necessitates hospital stay. Code Status:   DVT Prophylaxis:   GI Prophylaxis:  Baseline:     Subjective:   CHIEF COMPLAINT: Bigeminy on monitor    HISTORY OF PRESENT ILLNESS:     Bharati Cook is a 81 y.o.  male with PMHx significant for with recent prostatectomy came to the hospital today for elective hernia repair bigclaudiainy noticed on the monitor surgery consult recommended observation admission for telemetry monitor, patient has no symptoms.   We were asked to admit for work up and evaluation of the above

## 2023-08-31 NOTE — H&P
Expand AllCollapse All                                                                                                                                                                                                                                                                                                  Subjective:      Patient ID: Alejandra Moreno is a 81 y.o. male who comes in for consultation by Denilson Randle MD for a possible hernia             Chief Complaint   Patient presents with    New Patient       Seen at the request of Dr. Ananth Newton for the evaluation of a possible umbilical hernia. HPI  He had prostate cancer in the fall of . He has a ventral hernia at the time. Dr Shadia Anne did a RA prostatectomy and repaired the hernia primarily 10/12/2022. 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 The swelling continues to get larger and is painful at times. He also has urinary incontinence and is to start PT for that.         Past Medical History        Past Medical History:   Diagnosis Date    Arthritis      BPH (benign prostatic hyperplasia)      Prostate cancer ([address])      Umbilical hernia           Past Surgical History         Past Surgical History:   Procedure Laterality Date    COLONOSCOPY        TONSILLECTOMY             Family History         Family History   Problem Relation Age of Onset    Diabetes Paternal Grandmother      Heart Attack Paternal Grandmother      Stroke Paternal Grandmother           Social History            Tobacco Use    Smoking status: Former       Types: Cigarettes       Quit date: 1977       Years since quittin.5    Smokeless tobacco: Never   Vaping Use    Vaping Use: Never used   Substance Use Topics    Alcohol use: Not Currently    Drug use: Never           Current Outpatient Medications   Medication Sig    Multiple Vitamins-Minerals (AIRBORNE PO) Take by mouth    vitamin D3

## 2023-08-31 NOTE — PERIOP NOTE
Cardiology consult called to [facility] per Dr. Sasha Dallas order. Awaiting  return phone to call.

## 2023-08-31 NOTE — CONSULTS
Received a consult regarding patient being in bigeminy on the day of hernia repair procedure. Procedure has been cancelled. His PVCs are outflow tract with benign prognosis. He will need an echo. Pls admit to obs and order a TTE. I will see him after my cases.

## 2023-08-31 NOTE — PROGRESS NOTES
End of Shift Note    Bedside shift change report given to Luther Whatley (oncoming nurse) by Raquel Bazan RN (offgoing nurse). Report included the following information SBAR, Kardex, Intake/Output, and MAR    Shift worked:  1708-4037     Shift summary and any significant changes:     Patient admitted to the floor and made comfortable. Tele orders obtained. Pt went for a TTE. No complaints of pain or nausea.      Concerns for physician to address:    Zone phone for oncoming shift:

## 2023-08-31 NOTE — ANESTHESIA PRE PROCEDURE
GFRAA Cannot be calculated 10/17/2022 04:17 AM    AGRATIO 1.1 09/28/2022 07:12 AM    GLUCOSE 112 10/18/2022 04:17 AM    PROT 7.2 09/28/2022 07:12 AM    CALCIUM 8.5 10/18/2022 04:17 AM    BILITOT 0.5 09/28/2022 07:12 AM    ALKPHOS 96 09/28/2022 07:12 AM    AST 22 09/28/2022 07:12 AM    ALT 26 09/28/2022 07:12 AM       POC Tests: No results for input(s): POCGLU, POCNA, POCK, POCCL, POCBUN, POCHEMO, POCHCT in the last 72 hours. Coags:   Lab Results   Component Value Date/Time    PROTIME 10.1 09/28/2022 07:12 AM    INR 1.0 09/28/2022 07:12 AM    APTT 27.9 09/28/2022 07:12 AM       HCG (If Applicable): No results found for: PREGTESTUR, PREGSERUM, HCG, HCGQUANT     ABGs: No results found for: PHART, PO2ART, ZGF4BQS, BUO9FKP, BEART, E5YULZQY     Type & Screen (If Applicable):  No results found for: LABABO, LABRH    Drug/Infectious Status (If Applicable):  No results found for: HIV, HEPCAB    COVID-19 Screening (If Applicable): No results found for: COVID19        Anesthesia Evaluation  Patient summary reviewed and Nursing notes reviewed  Airway: Mallampati: III     Neck ROM: full  Mouth opening: > = 3 FB   Dental:    (+) caps      Pulmonary:Negative Pulmonary ROS breath sounds clear to auscultation                             Cardiovascular:Negative CV ROS  Exercise tolerance: good (>4 METS),                     Neuro/Psych:   Negative Neuro/Psych ROS              GI/Hepatic/Renal: Neg GI/Hepatic/Renal ROS            Endo/Other:    (+) : arthritis:., malignancy/cancer. ROS comment: Hx prostate ca  Recurrent incisional hernia   Abdominal:             Vascular: negative vascular ROS. Other Findings:           Anesthesia Plan      general     ASA 2       Induction: intravenous. BIS  MIPS: Postoperative opioids intended and Prophylactic antiemetics administered. Anesthetic plan and risks discussed with patient. Plan discussed with CRNA.     Attending anesthesiologist reviewed and agrees with

## 2023-08-31 NOTE — PERIOP NOTE
0820: Notified Dr. August Fontanez patient is having frequent PVCS. Received orders for EKG. EKG shows sinus rhythm with frequent premature ventricular complexes in a pattern of bigeminy. Received orders to consult cardiology. Pre op charge nurse paged cardiology. 0830: Patients wife in pre-op room with patient    2935:  Received orders from Dr. Lenox Rinne to give patients 2 grams magnesium IV and consult hospitalist to have them admit patient for observation. Do not continue with surgery at this time. He would like patient to have an echo before he leaves the hospital.  Dr. Lenox Rinne also stated he was going to put orders in connect care for 25 grams metoprolol twice daily. Paged hospitalist.     0499: Spoke with hospitalist, Dr. Leticia Reyes, she stated she will admit patient for observation. 1320: SBAR report given to [employer] RN    1325: Patient transferred to run via wheelchair by [facility].

## 2023-08-31 NOTE — CONSULTS
[facility] EP/ ARRHYTHMIA/ CARDIOLOGY CONSULT      EP Cardiology consult requested by Nguyễn Doyle MD for Frequent PVCs  PCP:  Pratibha Duran MD    Primary cardiologist: None     Electrophysiology Service  2023     Assessment:   Ventricular bigeminy   PVCs with left inferior axis. LBBB in V1 and V2-V3 transition. MDI>. 55 likely from LCC/RCC commissure vs aortomitral continuity. EF is normal   Asymptomatic   No history of coronary disease. Hernia  Hernia surgery was cancelled today due to PVCs    Plan:   Start Toprol XL 50 mg   Will arrange 7 day Holter and follow up with me in 1 month  Can go home.         []    High complexity decision making was performed  []    Patient is at high-risk of decompensation with multiple organ involvement       HPI:  Melvina Valdez is a 81 y.o. male with history of hernia who came in for a hernia repair and was found to be in bigeminy. Procedure cancelled and was admitted to obs. Echo was performed EF is normal. Patient is not symptomatic.       Allergies   Allergen Reactions    Sulfamethoxazole-Trimethoprim Other (See Comments) and Rash     weakness     Past Medical History:   Diagnosis Date    Arthritis     hands, knees    At risk for sleep apnea     BPH (benign prostatic hyperplasia)     with urinary incontinence    Prostate cancer ([address])     Umbilical hernia      Past Surgical History:   Procedure Laterality Date    COLONOSCOPY      HERNIA REPAIR  587    umbilical    PROSTATECTOMY      TONSILLECTOMY      as a child     Family History   Problem Relation Age of Onset    Diabetes Paternal Grandmother     Heart Attack Paternal Grandmother     Stroke Paternal Grandmother      Social History     Tobacco Use    Smoking status: Former     Packs/day: 1.50     Years: 2.00     Pack years: 3.00     Types: Cigarettes     Quit date: 1977     Years since quittin.6    Smokeless tobacco: Never   Vaping Use    Vaping Use: Never used   Substance Use Jose Knutson MD        polyethylene glycol (GLYCOLAX) packet 17 g  17 g Oral Daily PRN Jose Knutson MD        acetaminophen (TYLENOL) tablet 650 mg  650 mg Oral Q6H PRN Jose Kuntson MD        Or    acetaminophen (TYLENOL) suppository 650 mg  650 mg Rectal Q6H PRN Jose Knutson MD        hydrALAZINE (APRESOLINE) injection 5 mg  5 mg IntraVENous Q6H PRN Jose Knutson MD        metoprolol tartrate (LOPRESSOR) tablet 50 mg  50 mg Oral BID MD Prabhjot Jaimes MD  Clinical Cardiac Electrophysiology  [facility]  8/31/2023

## 2023-09-01 VITALS
WEIGHT: 188.05 LBS | SYSTOLIC BLOOD PRESSURE: 122 MMHG | TEMPERATURE: 98.2 F | OXYGEN SATURATION: 93 % | DIASTOLIC BLOOD PRESSURE: 75 MMHG | HEART RATE: 62 BPM | BODY MASS INDEX: 27.85 KG/M2 | RESPIRATION RATE: 16 BRPM | HEIGHT: 69 IN

## 2023-09-01 LAB
ANION GAP SERPL CALC-SCNC: 3 MMOL/L (ref 5–15)
BUN SERPL-MCNC: 20 MG/DL (ref 6–20)
BUN/CREAT SERPL: 17 (ref 12–20)
CALCIUM SERPL-MCNC: 8.4 MG/DL (ref 8.5–10.1)
CHLORIDE SERPL-SCNC: 109 MMOL/L (ref 97–108)
CO2 SERPL-SCNC: 26 MMOL/L (ref 21–32)
CREAT SERPL-MCNC: 1.16 MG/DL (ref 0.7–1.3)
EKG ATRIAL RATE: 77 BPM
EKG DIAGNOSIS: NORMAL
EKG P AXIS: 46 DEGREES
EKG P-R INTERVAL: 142 MS
EKG Q-T INTERVAL: 404 MS
EKG QRS DURATION: 86 MS
EKG QTC CALCULATION (BAZETT): 457 MS
EKG R AXIS: 0 DEGREES
EKG T AXIS: 45 DEGREES
EKG VENTRICULAR RATE: 77 BPM
ERYTHROCYTE [DISTWIDTH] IN BLOOD BY AUTOMATED COUNT: 12.6 % (ref 11.5–14.5)
GLUCOSE SERPL-MCNC: 101 MG/DL (ref 65–100)
HCT VFR BLD AUTO: 47 % (ref 36.6–50.3)
HGB BLD-MCNC: 15.3 G/DL (ref 12.1–17)
MAGNESIUM SERPL-MCNC: 2.5 MG/DL (ref 1.6–2.4)
MCH RBC QN AUTO: 30.2 PG (ref 26–34)
MCHC RBC AUTO-ENTMCNC: 32.6 G/DL (ref 30–36.5)
MCV RBC AUTO: 92.9 FL (ref 80–99)
NRBC # BLD: 0 K/UL (ref 0–0.01)
NRBC BLD-RTO: 0 PER 100 WBC
PLATELET # BLD AUTO: 156 K/UL (ref 150–400)
PMV BLD AUTO: 11.4 FL (ref 8.9–12.9)
POTASSIUM SERPL-SCNC: 4.6 MMOL/L (ref 3.5–5.1)
RBC # BLD AUTO: 5.06 M/UL (ref 4.1–5.7)
SODIUM SERPL-SCNC: 138 MMOL/L (ref 136–145)
WBC # BLD AUTO: 5.8 K/UL (ref 4.1–11.1)

## 2023-09-01 PROCEDURE — 6370000000 HC RX 637 (ALT 250 FOR IP): Performed by: INTERNAL MEDICINE

## 2023-09-01 PROCEDURE — 85027 COMPLETE CBC AUTOMATED: CPT

## 2023-09-01 PROCEDURE — 83735 ASSAY OF MAGNESIUM: CPT

## 2023-09-01 PROCEDURE — 2580000003 HC RX 258: Performed by: STUDENT IN AN ORGANIZED HEALTH CARE EDUCATION/TRAINING PROGRAM

## 2023-09-01 PROCEDURE — 36415 COLL VENOUS BLD VENIPUNCTURE: CPT

## 2023-09-01 PROCEDURE — G0378 HOSPITAL OBSERVATION PER HR: HCPCS

## 2023-09-01 PROCEDURE — 80048 BASIC METABOLIC PNL TOTAL CA: CPT

## 2023-09-01 RX ORDER — METOPROLOL TARTRATE 50 MG/1
50 TABLET, FILM COATED ORAL 2 TIMES DAILY
Qty: 60 TABLET | Refills: 3 | Status: SHIPPED | OUTPATIENT
Start: 2023-09-01

## 2023-09-01 RX ADMIN — SODIUM CHLORIDE, PRESERVATIVE FREE 10 ML: 5 INJECTION INTRAVENOUS at 08:39

## 2023-09-01 RX ADMIN — METOPROLOL TARTRATE 50 MG: 50 TABLET ORAL at 08:38

## 2023-09-01 ASSESSMENT — PAIN SCALES - GENERAL
PAINLEVEL_OUTOF10: 0
PAINLEVEL_OUTOF10: 0

## 2023-09-01 NOTE — PROGRESS NOTES
Surgery  Will plan office follow up and surgery once his cardiac work up is complete    Louise Cole MD FACS

## 2023-09-01 NOTE — PROGRESS NOTES
Pt given discharge instructions and no further questions were asked. IV removed and tele monitor removed. Patient transported home via wife.

## 2023-09-05 ENCOUNTER — TELEPHONE (OUTPATIENT)
Age: 73
End: 2023-09-05

## 2023-09-05 NOTE — TELEPHONE ENCOUNTER
Patient called stating their surgery for last week was cancelled due to EKG issues, but that the MD told them to reschedule with the surgery scheduler. I informed the , and they said they'll double check to see if they have the order in, and will call the patient back. I informed them, and cancelled the PO appt that had been previously scheduled. Please call patient back to schedule surgery.

## 2023-10-02 ENCOUNTER — TELEPHONE (OUTPATIENT)
Age: 73
End: 2023-10-02

## 2023-10-02 NOTE — TELEPHONE ENCOUNTER
Patient called to reschedule surgery, cardiologist pushed appt out to 10.26.23, I rescheduled fu prior to surgery to 10.31.23, needs to reschedule surgery and po, please advise.  Needed cardiac clearance     [phone]

## 2023-10-31 ENCOUNTER — OFFICE VISIT (OUTPATIENT)
Age: 73
End: 2023-10-31
Payer: MEDICARE

## 2023-10-31 VITALS
TEMPERATURE: 97.6 F | HEART RATE: 40 BPM | HEIGHT: 69 IN | BODY MASS INDEX: 28.68 KG/M2 | RESPIRATION RATE: 20 BRPM | SYSTOLIC BLOOD PRESSURE: 133 MMHG | OXYGEN SATURATION: 96 % | DIASTOLIC BLOOD PRESSURE: 71 MMHG | WEIGHT: 193.6 LBS

## 2023-10-31 DIAGNOSIS — K43.2 RECURRENT INCISIONAL HERNIA: Primary | ICD-10-CM

## 2023-10-31 PROCEDURE — 1123F ACP DISCUSS/DSCN MKR DOCD: CPT | Performed by: SURGERY

## 2023-10-31 PROCEDURE — G8427 DOCREV CUR MEDS BY ELIG CLIN: HCPCS | Performed by: SURGERY

## 2023-10-31 PROCEDURE — 99214 OFFICE O/P EST MOD 30 MIN: CPT | Performed by: SURGERY

## 2023-10-31 PROCEDURE — G8419 CALC BMI OUT NRM PARAM NOF/U: HCPCS | Performed by: SURGERY

## 2023-10-31 PROCEDURE — 3017F COLORECTAL CA SCREEN DOC REV: CPT | Performed by: SURGERY

## 2023-10-31 PROCEDURE — G8484 FLU IMMUNIZE NO ADMIN: HCPCS | Performed by: SURGERY

## 2023-10-31 PROCEDURE — 1036F TOBACCO NON-USER: CPT | Performed by: SURGERY

## 2023-10-31 ASSESSMENT — ENCOUNTER SYMPTOMS
DIARRHEA: 0
BACK PAIN: 0
VOMITING: 0
SHORTNESS OF BREATH: 0
COUGH: 0
BLOOD IN STOOL: 0
EYE PAIN: 0
ABDOMINAL PAIN: 1
STRIDOR: 0
SORE THROAT: 0
CONSTIPATION: 0
NAUSEA: 0
WHEEZING: 0

## 2023-10-31 ASSESSMENT — PATIENT HEALTH QUESTIONNAIRE - PHQ9
2. FEELING DOWN, DEPRESSED OR HOPELESS: 0
SUM OF ALL RESPONSES TO PHQ QUESTIONS 1-9: 0
SUM OF ALL RESPONSES TO PHQ9 QUESTIONS 1 & 2: 0
1. LITTLE INTEREST OR PLEASURE IN DOING THINGS: 0
SUM OF ALL RESPONSES TO PHQ QUESTIONS 1-9: 0

## 2023-10-31 NOTE — PROGRESS NOTES
Subjective:      Patient ID: Lali Paredes is a 81 y.o. male who comes in for reevaluation by Zain Velázquez MD for a  hernia      Chief Complaint   Patient presents with    Follow-up     Prior to robotic assisted laparoscopic recurrent ventral/incisional hernia repair with mesh        HPI  He had prostate cancer in the fall of . He has a ventral hernia at the time. Dr Carlitos Ferrell did a RA prostatectomy and repaired the hernia primarily 10/12/2022. 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 The swelling continues to get larger and is painful at times. He also has urinary incontinence and is to start PT for that. I saw him in 2023 and planned repair of a recurrent incisional hernia. He has needed cardiac work up and is planning a procedure by Dr Sabas Alexander on 2023. It is felt that he would be ok for surgery after that. Past Medical History:   Diagnosis Date    Arthritis     hands, knees    At risk for sleep apnea     BPH (benign prostatic hyperplasia)     with urinary incontinence    Prostate cancer [facility])     Umbilical hernia      Past Surgical History:   Procedure Laterality Date    COLONOSCOPY      HERNIA REPAIR      umbilical    PROSTATECTOMY      TONSILLECTOMY      as a child     Family History   Problem Relation Age of Onset    Heart Disease Father     Diabetes Paternal Grandmother     Heart Attack Paternal Grandmother     Stroke Paternal Grandmother      Social History     Tobacco Use    Smoking status: Former     Packs/day: 1.50     Years: 2.00     Additional pack years: 0.00     Total pack years: 3.00     Types: Cigarettes     Quit date: 1977     Years since quittin.8    Smokeless tobacco: Never   Vaping Use    Vaping Use: Never used   Substance Use Topics    Alcohol use:  Yes     Alcohol/week: 1.0 standard drink of alcohol     Types: 1 Shots of liquor per week     Comment: occasionally    Drug use:

## 2023-11-22 NOTE — PERIOP NOTE
Received office notes and EKG from Dr. Mega Cardona dated 11/21/23 for ablation follow-up. Copy on paper chart.

## 2023-11-30 ENCOUNTER — HOSPITAL ENCOUNTER (OUTPATIENT)
Facility: HOSPITAL | Age: 73
Setting detail: OUTPATIENT SURGERY
Discharge: HOME | End: 2023-11-30
Attending: SURGERY | Admitting: SURGERY
Payer: MEDICARE

## 2023-11-30 ENCOUNTER — ANESTHESIA EVENT (OUTPATIENT)
Facility: HOSPITAL | Age: 73
End: 2023-11-30
Payer: MEDICARE

## 2023-11-30 ENCOUNTER — ANESTHESIA (OUTPATIENT)
Facility: HOSPITAL | Age: 73
End: 2023-11-30
Payer: MEDICARE

## 2023-11-30 VITALS
RESPIRATION RATE: 12 BRPM | OXYGEN SATURATION: 92 % | DIASTOLIC BLOOD PRESSURE: 89 MMHG | BODY MASS INDEX: 28.02 KG/M2 | SYSTOLIC BLOOD PRESSURE: 124 MMHG | WEIGHT: 189.15 LBS | TEMPERATURE: 98 F | HEIGHT: 69 IN | HEART RATE: 75 BPM

## 2023-11-30 DIAGNOSIS — K43.2 RECURRENT INCISIONAL HERNIA: Primary | ICD-10-CM

## 2023-11-30 PROCEDURE — S2900 ROBOTIC SURGICAL SYSTEM: HCPCS | Performed by: SURGERY

## 2023-11-30 PROCEDURE — 6360000002 HC RX W HCPCS: Performed by: SURGERY

## 2023-11-30 PROCEDURE — 2580000003 HC RX 258: Performed by: ANESTHESIOLOGY

## 2023-11-30 PROCEDURE — 3700000001 HC ADD 15 MINUTES (ANESTHESIA): Performed by: SURGERY

## 2023-11-30 PROCEDURE — 6370000000 HC RX 637 (ALT 250 FOR IP): Performed by: STUDENT IN AN ORGANIZED HEALTH CARE EDUCATION/TRAINING PROGRAM

## 2023-11-30 PROCEDURE — 7100000010 HC PHASE II RECOVERY - FIRST 15 MIN: Performed by: SURGERY

## 2023-11-30 PROCEDURE — 3700000000 HC ANESTHESIA ATTENDED CARE: Performed by: SURGERY

## 2023-11-30 PROCEDURE — 2709999900 HC NON-CHARGEABLE SUPPLY: Performed by: SURGERY

## 2023-11-30 PROCEDURE — 6360000002 HC RX W HCPCS: Performed by: STUDENT IN AN ORGANIZED HEALTH CARE EDUCATION/TRAINING PROGRAM

## 2023-11-30 PROCEDURE — 2500000003 HC RX 250 WO HCPCS: Performed by: STUDENT IN AN ORGANIZED HEALTH CARE EDUCATION/TRAINING PROGRAM

## 2023-11-30 PROCEDURE — 6360000002 HC RX W HCPCS: Performed by: REGISTERED NURSE

## 2023-11-30 PROCEDURE — 7100000011 HC PHASE II RECOVERY - ADDTL 15 MIN: Performed by: SURGERY

## 2023-11-30 PROCEDURE — 3600000019 HC SURGERY ROBOT ADDTL 15MIN: Performed by: SURGERY

## 2023-11-30 PROCEDURE — C1781 MESH (IMPLANTABLE): HCPCS | Performed by: SURGERY

## 2023-11-30 PROCEDURE — 7100000001 HC PACU RECOVERY - ADDTL 15 MIN: Performed by: SURGERY

## 2023-11-30 PROCEDURE — 3600000009 HC SURGERY ROBOT BASE: Performed by: SURGERY

## 2023-11-30 PROCEDURE — 2500000003 HC RX 250 WO HCPCS: Performed by: REGISTERED NURSE

## 2023-11-30 PROCEDURE — 7100000000 HC PACU RECOVERY - FIRST 15 MIN: Performed by: SURGERY

## 2023-11-30 PROCEDURE — 2580000003 HC RX 258: Performed by: SURGERY

## 2023-11-30 DEVICE — VENTRALIGHT ST MESH, 4" X 6" (10.2 CM X 15.2 CM), ELLIPSE
Type: IMPLANTABLE DEVICE | Site: ABDOMEN | Status: FUNCTIONAL
Brand: VENTRALIGHT

## 2023-11-30 RX ORDER — FENTANYL CITRATE 50 UG/ML
25 INJECTION, SOLUTION INTRAMUSCULAR; INTRAVENOUS EVERY 5 MIN PRN
Status: COMPLETED | OUTPATIENT
Start: 2023-11-30 | End: 2023-11-30

## 2023-11-30 RX ORDER — HYDROMORPHONE HYDROCHLORIDE 2 MG/ML
INJECTION, SOLUTION INTRAMUSCULAR; INTRAVENOUS; SUBCUTANEOUS PRN
Status: DISCONTINUED | OUTPATIENT
Start: 2023-11-30 | End: 2023-11-30 | Stop reason: SDUPTHER

## 2023-11-30 RX ORDER — IBUPROFEN 800 MG/1
800 TABLET ORAL EVERY 8 HOURS PRN
Qty: 20 TABLET | Refills: 0 | Status: SHIPPED | OUTPATIENT
Start: 2023-11-30 | End: 2023-12-07

## 2023-11-30 RX ORDER — BUPIVACAINE HYDROCHLORIDE 5 MG/ML
INJECTION, SOLUTION PERINEURAL PRN
Status: DISCONTINUED | OUTPATIENT
Start: 2023-11-30 | End: 2023-11-30 | Stop reason: ALTCHOICE

## 2023-11-30 RX ORDER — PHENYLEPHRINE HCL IN 0.9% NACL 0.4MG/10ML
SYRINGE (ML) INTRAVENOUS PRN
Status: DISCONTINUED | OUTPATIENT
Start: 2023-11-30 | End: 2023-11-30 | Stop reason: SDUPTHER

## 2023-11-30 RX ORDER — ACETAMINOPHEN 500 MG
1000 TABLET ORAL ONCE
Status: COMPLETED | OUTPATIENT
Start: 2023-11-30 | End: 2023-11-30

## 2023-11-30 RX ORDER — ROCURONIUM BROMIDE 10 MG/ML
INJECTION, SOLUTION INTRAVENOUS PRN
Status: DISCONTINUED | OUTPATIENT
Start: 2023-11-30 | End: 2023-11-30 | Stop reason: SDUPTHER

## 2023-11-30 RX ORDER — LIDOCAINE HYDROCHLORIDE 10 MG/ML
1 INJECTION, SOLUTION EPIDURAL; INFILTRATION; INTRACAUDAL; PERINEURAL
Status: DISCONTINUED | OUTPATIENT
Start: 2023-11-30 | End: 2023-11-30 | Stop reason: HOSPADM

## 2023-11-30 RX ORDER — PROPOFOL 10 MG/ML
INJECTION, EMULSION INTRAVENOUS PRN
Status: DISCONTINUED | OUTPATIENT
Start: 2023-11-30 | End: 2023-11-30 | Stop reason: SDUPTHER

## 2023-11-30 RX ORDER — SODIUM CHLORIDE, SODIUM LACTATE, POTASSIUM CHLORIDE, CALCIUM CHLORIDE 600; 310; 30; 20 MG/100ML; MG/100ML; MG/100ML; MG/100ML
INJECTION, SOLUTION INTRAVENOUS CONTINUOUS
Status: DISCONTINUED | OUTPATIENT
Start: 2023-11-30 | End: 2023-11-30 | Stop reason: HOSPADM

## 2023-11-30 RX ORDER — SODIUM CHLORIDE 0.9 % (FLUSH) 0.9 %
5-40 SYRINGE (ML) INJECTION EVERY 12 HOURS SCHEDULED
Status: DISCONTINUED | OUTPATIENT
Start: 2023-11-30 | End: 2023-11-30 | Stop reason: HOSPADM

## 2023-11-30 RX ORDER — EPHEDRINE SULFATE/0.9% NACL/PF 50 MG/5 ML
SYRINGE (ML) INTRAVENOUS PRN
Status: DISCONTINUED | OUTPATIENT
Start: 2023-11-30 | End: 2023-11-30 | Stop reason: SDUPTHER

## 2023-11-30 RX ORDER — FENTANYL CITRATE 50 UG/ML
INJECTION, SOLUTION INTRAMUSCULAR; INTRAVENOUS PRN
Status: DISCONTINUED | OUTPATIENT
Start: 2023-11-30 | End: 2023-11-30 | Stop reason: SDUPTHER

## 2023-11-30 RX ORDER — MIDAZOLAM HYDROCHLORIDE 1 MG/ML
INJECTION INTRAMUSCULAR; INTRAVENOUS PRN
Status: DISCONTINUED | OUTPATIENT
Start: 2023-11-30 | End: 2023-11-30 | Stop reason: SDUPTHER

## 2023-11-30 RX ORDER — OXYCODONE HYDROCHLORIDE 5 MG/1
5 TABLET ORAL EVERY 6 HOURS PRN
Qty: 12 TABLET | Refills: 0 | Status: SHIPPED | OUTPATIENT
Start: 2023-11-30 | End: 2023-12-03

## 2023-11-30 RX ORDER — ONDANSETRON 2 MG/ML
INJECTION INTRAMUSCULAR; INTRAVENOUS PRN
Status: DISCONTINUED | OUTPATIENT
Start: 2023-11-30 | End: 2023-11-30 | Stop reason: SDUPTHER

## 2023-11-30 RX ORDER — HYDROMORPHONE HYDROCHLORIDE 1 MG/ML
0.5 INJECTION, SOLUTION INTRAMUSCULAR; INTRAVENOUS; SUBCUTANEOUS EVERY 5 MIN PRN
Status: DISCONTINUED | OUTPATIENT
Start: 2023-11-30 | End: 2023-11-30 | Stop reason: HOSPADM

## 2023-11-30 RX ORDER — SODIUM CHLORIDE 9 MG/ML
INJECTION, SOLUTION INTRAVENOUS PRN
Status: DISCONTINUED | OUTPATIENT
Start: 2023-11-30 | End: 2023-11-30 | Stop reason: HOSPADM

## 2023-11-30 RX ORDER — FENTANYL CITRATE 50 UG/ML
100 INJECTION, SOLUTION INTRAMUSCULAR; INTRAVENOUS
Status: DISCONTINUED | OUTPATIENT
Start: 2023-11-30 | End: 2023-11-30 | Stop reason: HOSPADM

## 2023-11-30 RX ORDER — MIDAZOLAM HYDROCHLORIDE 2 MG/2ML
2 INJECTION, SOLUTION INTRAMUSCULAR; INTRAVENOUS
Status: DISCONTINUED | OUTPATIENT
Start: 2023-11-30 | End: 2023-11-30 | Stop reason: HOSPADM

## 2023-11-30 RX ORDER — PROCHLORPERAZINE EDISYLATE 5 MG/ML
5 INJECTION INTRAMUSCULAR; INTRAVENOUS
Status: COMPLETED | OUTPATIENT
Start: 2023-11-30 | End: 2023-11-30

## 2023-11-30 RX ORDER — ONDANSETRON 2 MG/ML
4 INJECTION INTRAMUSCULAR; INTRAVENOUS
Status: DISCONTINUED | OUTPATIENT
Start: 2023-11-30 | End: 2023-11-30 | Stop reason: HOSPADM

## 2023-11-30 RX ORDER — SODIUM CHLORIDE 0.9 % (FLUSH) 0.9 %
5-40 SYRINGE (ML) INJECTION PRN
Status: DISCONTINUED | OUTPATIENT
Start: 2023-11-30 | End: 2023-11-30 | Stop reason: HOSPADM

## 2023-11-30 RX ORDER — HYDRALAZINE HYDROCHLORIDE 20 MG/ML
10 INJECTION INTRAMUSCULAR; INTRAVENOUS
Status: DISCONTINUED | OUTPATIENT
Start: 2023-11-30 | End: 2023-11-30 | Stop reason: HOSPADM

## 2023-11-30 RX ORDER — LIDOCAINE HYDROCHLORIDE 20 MG/ML
INJECTION, SOLUTION EPIDURAL; INFILTRATION; INTRACAUDAL; PERINEURAL PRN
Status: DISCONTINUED | OUTPATIENT
Start: 2023-11-30 | End: 2023-11-30 | Stop reason: SDUPTHER

## 2023-11-30 RX ORDER — DEXAMETHASONE SODIUM PHOSPHATE 4 MG/ML
INJECTION, SOLUTION INTRA-ARTICULAR; INTRALESIONAL; INTRAMUSCULAR; INTRAVENOUS; SOFT TISSUE PRN
Status: DISCONTINUED | OUTPATIENT
Start: 2023-11-30 | End: 2023-11-30 | Stop reason: SDUPTHER

## 2023-11-30 RX ORDER — POLYETHYLENE GLYCOL 3350 17 G/17G
17 POWDER, FOR SOLUTION ORAL 2 TIMES DAILY
Qty: 510 G | Refills: 0 | Status: SHIPPED | OUTPATIENT
Start: 2023-11-30 | End: 2023-12-15

## 2023-11-30 RX ORDER — OXYCODONE HYDROCHLORIDE 5 MG/1
5 TABLET ORAL
Status: COMPLETED | OUTPATIENT
Start: 2023-11-30 | End: 2023-11-30

## 2023-11-30 RX ADMIN — ONDANSETRON 4 MG: 2 INJECTION INTRAMUSCULAR; INTRAVENOUS at 10:59

## 2023-11-30 RX ADMIN — Medication 5 MG: at 10:25

## 2023-11-30 RX ADMIN — Medication 5 MG: at 10:40

## 2023-11-30 RX ADMIN — ROCURONIUM BROMIDE 5 MG: 10 INJECTION INTRAVENOUS at 10:08

## 2023-11-30 RX ADMIN — ROCURONIUM BROMIDE 25 MG: 10 INJECTION INTRAVENOUS at 10:20

## 2023-11-30 RX ADMIN — Medication 40 MCG: at 10:40

## 2023-11-30 RX ADMIN — ACETAMINOPHEN 1000 MG: 500 TABLET ORAL at 08:37

## 2023-11-30 RX ADMIN — LIDOCAINE HYDROCHLORIDE 80 MG: 20 INJECTION, SOLUTION EPIDURAL; INFILTRATION; INTRACAUDAL; PERINEURAL at 10:08

## 2023-11-30 RX ADMIN — HYDROMORPHONE HYDROCHLORIDE 0.2 MG: 2 INJECTION INTRAMUSCULAR; INTRAVENOUS; SUBCUTANEOUS at 11:27

## 2023-11-30 RX ADMIN — Medication 40 MCG: at 10:49

## 2023-11-30 RX ADMIN — PROPOFOL 50 MG: 10 INJECTION, EMULSION INTRAVENOUS at 10:20

## 2023-11-30 RX ADMIN — FENTANYL CITRATE 50 MCG: 50 INJECTION, SOLUTION INTRAMUSCULAR; INTRAVENOUS at 10:18

## 2023-11-30 RX ADMIN — PROCHLORPERAZINE EDISYLATE 5 MG: 5 INJECTION INTRAMUSCULAR; INTRAVENOUS at 11:42

## 2023-11-30 RX ADMIN — SODIUM CHLORIDE, POTASSIUM CHLORIDE, SODIUM LACTATE AND CALCIUM CHLORIDE: 600; 310; 30; 20 INJECTION, SOLUTION INTRAVENOUS at 11:19

## 2023-11-30 RX ADMIN — Medication 40 MCG: at 10:25

## 2023-11-30 RX ADMIN — FENTANYL CITRATE 50 MCG: 50 INJECTION, SOLUTION INTRAMUSCULAR; INTRAVENOUS at 10:08

## 2023-11-30 RX ADMIN — DEXAMETHASONE SODIUM PHOSPHATE 8 MG: 4 INJECTION INTRA-ARTICULAR; INTRALESIONAL; INTRAMUSCULAR; INTRAVENOUS; SOFT TISSUE at 10:20

## 2023-11-30 RX ADMIN — Medication 5 MG: at 10:49

## 2023-11-30 RX ADMIN — FENTANYL CITRATE 25 MCG: 50 INJECTION INTRAMUSCULAR; INTRAVENOUS at 12:08

## 2023-11-30 RX ADMIN — SUGAMMADEX 200 MG: 100 INJECTION, SOLUTION INTRAVENOUS at 11:09

## 2023-11-30 RX ADMIN — FENTANYL CITRATE 25 MCG: 50 INJECTION INTRAMUSCULAR; INTRAVENOUS at 12:02

## 2023-11-30 RX ADMIN — PROPOFOL 50 MG: 10 INJECTION, EMULSION INTRAVENOUS at 10:10

## 2023-11-30 RX ADMIN — PROPOFOL 30 MG: 10 INJECTION, EMULSION INTRAVENOUS at 10:44

## 2023-11-30 RX ADMIN — WATER 2000 MG: 1 INJECTION INTRAMUSCULAR; INTRAVENOUS; SUBCUTANEOUS at 10:18

## 2023-11-30 RX ADMIN — FENTANYL CITRATE 25 MCG: 50 INJECTION, SOLUTION INTRAMUSCULAR; INTRAVENOUS at 11:22

## 2023-11-30 RX ADMIN — ROCURONIUM BROMIDE 10 MG: 10 INJECTION INTRAVENOUS at 10:42

## 2023-11-30 RX ADMIN — OXYCODONE 5 MG: 5 TABLET ORAL at 11:45

## 2023-11-30 RX ADMIN — FENTANYL CITRATE 25 MCG: 50 INJECTION INTRAMUSCULAR; INTRAVENOUS at 12:17

## 2023-11-30 RX ADMIN — HYDROMORPHONE HYDROCHLORIDE 0.5 MG: 1 INJECTION, SOLUTION INTRAMUSCULAR; INTRAVENOUS; SUBCUTANEOUS at 12:22

## 2023-11-30 RX ADMIN — FENTANYL CITRATE 25 MCG: 50 INJECTION INTRAMUSCULAR; INTRAVENOUS at 11:44

## 2023-11-30 RX ADMIN — FENTANYL CITRATE 25 MCG: 50 INJECTION, SOLUTION INTRAMUSCULAR; INTRAVENOUS at 11:14

## 2023-11-30 RX ADMIN — FENTANYL CITRATE 25 MCG: 50 INJECTION, SOLUTION INTRAMUSCULAR; INTRAVENOUS at 11:25

## 2023-11-30 RX ADMIN — SODIUM CHLORIDE, POTASSIUM CHLORIDE, SODIUM LACTATE AND CALCIUM CHLORIDE: 600; 310; 30; 20 INJECTION, SOLUTION INTRAVENOUS at 08:44

## 2023-11-30 RX ADMIN — MIDAZOLAM HYDROCHLORIDE 1 MG: 1 INJECTION, SOLUTION INTRAMUSCULAR; INTRAVENOUS at 10:05

## 2023-11-30 RX ADMIN — PROPOFOL 150 MG: 10 INJECTION, EMULSION INTRAVENOUS at 10:08

## 2023-11-30 RX ADMIN — HYDROMORPHONE HYDROCHLORIDE 0.2 MG: 2 INJECTION INTRAMUSCULAR; INTRAVENOUS; SUBCUTANEOUS at 10:55

## 2023-11-30 ASSESSMENT — PAIN SCALES - GENERAL
PAINLEVEL_OUTOF10: 8
PAINLEVEL_OUTOF10: 3
PAINLEVEL_OUTOF10: 6
PAINLEVEL_OUTOF10: 8
PAINLEVEL_OUTOF10: 0
PAINLEVEL_OUTOF10: 6
PAINLEVEL_OUTOF10: 2
PAINLEVEL_OUTOF10: 6
PAINLEVEL_OUTOF10: 10

## 2023-11-30 ASSESSMENT — PAIN DESCRIPTION - DESCRIPTORS
DESCRIPTORS: SORE
DESCRIPTORS: SORE
DESCRIPTORS: ACHING

## 2023-11-30 ASSESSMENT — PAIN DESCRIPTION - ORIENTATION
ORIENTATION: ANTERIOR
ORIENTATION: ANTERIOR;MID
ORIENTATION: ANTERIOR
ORIENTATION: ANTERIOR

## 2023-11-30 ASSESSMENT — PAIN - FUNCTIONAL ASSESSMENT: PAIN_FUNCTIONAL_ASSESSMENT: NONE - DENIES PAIN

## 2023-11-30 ASSESSMENT — PAIN DESCRIPTION - LOCATION
LOCATION: ABDOMEN

## 2023-11-30 NOTE — OP NOTE
[facility]  OPERATIVE REPORT    Name:  Stacie Perdomo  MR#:  [id number]  :  1950  ACCOUNT #:  [de-identified]  DATE OF SERVICE:  2023    PREOPERATIVE DIAGNOSIS:  Recurrent ventral incisional hernia. POSTOPERATIVE DIAGNOSIS:  Recurrent ventral incisional hernia. PROCEDURE PERFORMED:  Robotic assisted laparoscopic recurrent ventral incisional hernia repair with mesh. SURGEON:  Mary Davila MD    ASSISTANT:  Tila Salvador. ANESTHESIA:  General.    COMPLICATIONS:  None. SPECIMENS REMOVED:  None. IMPLANTS:  Bard Davol Ventralight mesh with [employer] 4 x 6 inch, lot [id number]. ESTIMATED BLOOD LOSS:  Minimal.    DRAINS:  None. FINDINGS:  A 5 cm defect. BRIEF HISTORY:  The patient is a 62-year-old gentleman who had a ventral hernia and underwent a robotic assisted prostatectomy a year ago and had a repair at the same time. He developed recurrent hernia and now it is getting bigger more bothersome to him, he wishes to have the area repaired. He understands the risks and benefits and wished to proceed. PROCEDURE:   The patient was taken to the operating room, placed on the operating room table in the supine position, underwent general anesthesia, the head was mildly flexed and the abdomen was prepped and draped in the usual sterile fashion. After appropriate time-out and antibiotics were given 0.5% Marcaine was infiltrated in the skin and subcutaneous tissues in the left subcostal region and a small incision was made. An 8-mm direct entry trocar was inserted into the peritoneal cavity and insufflation began. A 15 mmHg pressure gave good visualization. Another 8-mm trocar was placed in the left lower quadrant laterally and one in the mid abdomen laterally and there was little bit of omentum that was tethered in the defect, that was taken down with electrocautery and took down a little bit of falciform ligament.   Then we docked the robot

## 2023-11-30 NOTE — H&P
Subjective:      Patient ID: Divya Interiano is a 81 y.o. male who comes in for reevaluation by Joseline Teran MD for a  hernia             Chief Complaint   Patient presents with    Follow-up       Prior to robotic assisted laparoscopic recurrent ventral/incisional hernia repair with mesh          HPI  He had prostate cancer in the fall of . He has a ventral hernia at the time. Dr Oleksandr Veliz did a RA prostatectomy and repaired the hernia primarily 10/12/2022. 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 The swelling continues to get larger and is painful at times. He also has urinary incontinence and is to start PT for that. I saw him in 2023 and planned repair of a recurrent incisional hernia. He has needed cardiac work up and is planning a procedure by Dr Lisbet Raphael on 2023. It is felt that he would be ok for surgery after that.      Past Medical History        Past Medical History:   Diagnosis Date    Arthritis       hands, knees    At risk for sleep apnea      BPH (benign prostatic hyperplasia)       with urinary incontinence    Prostate cancer [facility])      Umbilical hernia           Past Surgical History         Past Surgical History:   Procedure Laterality Date    COLONOSCOPY        HERNIA REPAIR   3630     umbilical    PROSTATECTOMY       TONSILLECTOMY         as a child         Family History         Family History   Problem Relation Age of Onset    Heart Disease Father      Diabetes Paternal Grandmother      Heart Attack Paternal Grandmother      Stroke Paternal Grandmother           Social History            Tobacco Use    Smoking status: Former       Packs/day: 1.50       Years: 2.00       Additional pack years: 0.00       Total pack years: 3.00       Types: Cigarettes       Quit date: 1977       Years since quittin.8    Smokeless tobacco: Never   Vaping Use    Vaping Use: Never used   Substance Use Topics

## 2023-11-30 NOTE — PERIOP NOTE
2965:  Consents reviewed with the patient. He verbalizes understanding of all info provided. Signature obtained for anesthesia & procedure. 4639:  Patient up to BR to change into surgical gown & empty bladder. He does have some urinary incontinence r/t prostates surgery & disposable underwear & a pad were provided. 3340:  Dr. Jodi Jenkins in to see the patient & discuss anesthesia plan of care. She is aware of BP's. No new orders at this time. Patient was advised to monitor BP's at home & f/u with PCP after discharge. 0900:  Patient resting comfortably in bed with call bell at his side.

## 2023-11-30 NOTE — FLOWSHEET NOTE
11/30/23 1353   Handoff   Communication Given Periop Handoff/Relief   Handoff phase Phase II receiving   Handoff Given To Carolina Taylor RN   Handoff Received From Preethi Perez RN   Handoff Communication Face to Face; At bedside   Time Handoff Given 5668

## 2023-11-30 NOTE — FLOWSHEET NOTE
11/30/23 1231   Family Communication    Relationship to Patient Spouse    Phone Number [facility] [phone]   Family/Significant Other Update Called   Delivery Origin Nurse   Update Given Yes   Family Communication   Family Update Message Patient stable

## 2023-11-30 NOTE — DISCHARGE INSTRUCTIONS
Discharge Instructions:  Hernia Repair    Dr. Inder Baker    Call for appointment for follow up in 2 weeks [phone]    Activity:    Walk regularly. No lifting more than 10 pounds for 6 weeks. Light aerobic activity is okay when you feel up to it. You may resume driving in five days unless still requiring narcotics for pain. Work:    You may return to work in 2 or 3 weeks to light activity. No lifting more than 5 pounds for four weeks and no more than 10 pounds for an additional 2 weeks. Diet:    You may resume normal diet after 24 hours. Anesthesia and narcotics may cause nausea and vomiting. If persistent please call the office. Wound Care: You have a special dressing called Dermabond. It is okay to shower and let the water run over the incisions but do not scrub the area or soak in a tub. If you have a small amount of drainage you may place a dry bandage over the wound and change it daily. If you experience a lot of drainage, develop redness around the wound, or a fever over 101 F occurs please call the office. Use ice over mid abdomen for 20 minutes every 1-2 hours to reduce swelling and pain. Wear abdominal binder at all times for 3 weeks and then when out of bed for an additional 3 weeks. Medications:    Resume home medications as indicated on the Medical Reconciliation form. Aspirin and Coumadin can be restarted immediately if you were taking them preoperatively. If taking Plavix do not restart it until post operative day 2. Pain medications:  Non steroidal antiinflammatories seem to work best for post surgical pain. Try these first as prescribed. A narcotic prescription will also be given for breakthrough pain. Over the counter stool softeners and laxatives may be used if needed. Do not hesitate to call with questions or concerns.     DISCHARGE SUMMARY from Nurse    PATIENT INSTRUCTIONS:    After general anesthesia or intravenous sedation, for 24 hours or while

## 2023-11-30 NOTE — ANESTHESIA POSTPROCEDURE EVALUATION
Department of Anesthesiology  Postprocedure Note    Patient: Melvina Valdez  MRN: [id number]  YOB: 1950  Date of evaluation: 11/30/2023      Procedure Summary       Date: 11/30/23 Room / Location: [facility] MAIN OR M9 / [facility] MAIN OR    Anesthesia Start: 1002 Anesthesia Stop: 2602    Procedure: ROBOT ASSISTED LAPAROSCOPIC RECURRENT INCISIONAL HERNIA REPAIR WITH MESH (Abdomen) Diagnosis:       Incisional hernia, without obstruction or gangrene      (Incisional hernia, without obstruction or gangrene [K43.2])    Providers: Roger Whittaker MD Responsible Provider: Magno Gallegos DO    Anesthesia Type: General ASA Status: 3            Anesthesia Type: General    Doyle Phase I: Doyle Score: 8    Doyle Phase II:        Anesthesia Post Evaluation    Patient location during evaluation: PACU  Patient participation: complete - patient participated  Level of consciousness: awake and alert  Pain score: 0  Airway patency: patent  Nausea & Vomiting: no nausea and no vomiting  Complications: no  Respiratory status: acceptable  Hydration status: euvolemic  Pain management: adequate
Never smoker

## 2023-11-30 NOTE — BRIEF OP NOTE
Brief Postoperative Note      Patient: Lali Paredes  YOB: 1950  MRN: [id number]    Date of Procedure: 11/30/2023    Pre-Op Diagnosis Codes:     * Incisional hernia, without obstruction or gangrene [K43.2]    Post-Op Diagnosis: Post-Op Diagnosis Codes:     * Incisional hernia, without obstruction or gangrene [K43.2]       Procedure(s):  ROBOT ASSISTED LAPAROSCOPIC RECURRENT INCISIONAL HERNIA REPAIR WITH MESH    Surgeon(s):  Cuco Medina MD    Assistant:  * No surgical staff found *    Anesthesia: General    Estimated Blood Loss (mL): Minimal    Complications: None    Specimens:   * No specimens in log *    Implants:  Implant Name Type Inv.  Item Serial No.  Lot No. LRB No. Used Action   MESH SURG [id number] ELLIPSE [employer] VENTRALIGHT - SNA  MESH SURG [id number] ELLIPSE [employer] VENTRALIGHT NA BARD DAVOL-WD [id number] N/A 1 Implanted         Drains: * No LDAs found *    Findings: 5 cm defect      Electronically signed by Jose Juan Suarez MD on 11/30/2023 at 11:08 AM

## 2023-11-30 NOTE — ANESTHESIA PRE PROCEDURE
Department of Anesthesiology  Preprocedure Note       Name:  Tanmay Lewis   Age:  72 y.o.  :  1950                                          MRN:  [id number]         Date:  2023      Surgeon: Mckenzie Seth):  Cyndi Goldstein MD    Procedure: Procedure(s):  ROBOT ASSISTED LAPAROSCOPIC RECURRENT INCISIONAL HERNIA REPAIR WITH MESH    Medications prior to admission:   Prior to Admission medications    Medication Sig Start Date End Date Taking?  Authorizing Provider   metoprolol tartrate (LOPRESSOR) 50 MG tablet Take 1 tablet by mouth 2 times daily 23   Caryl Alexandra MD   Multiple Vitamins-Minerals (AIRBORNE PO) Take 1 tablet by mouth daily    Harjit Harris MD   vitamin D3 (CHOLECALCIFEROL) 10 MCG (400 UNIT) TABS tablet Take 1 tablet by mouth daily    Harjit Harris MD   Multiple Vitamins-Minerals (MULTIVITAL PO) Take 1 tablet by mouth daily    Harjit Harris MD   Omega-3 1000 MG CAPS Take 1 capsule by mouth daily    Harjit Harris MD   Turmeric 400 MG CAPS Take 400 mg by mouth daily    Harjit Harris MD   Zinc 30 MG TABS Take 30 mg by mouth daily    Harjit Harris MD   magnesium 30 MG tablet Take 2 tablets by mouth daily    Harjit Harris MD   aspirin 81 MG chewable tablet Take 1 tablet by mouth daily    Harjit Harris MD       Current medications:    Current Facility-Administered Medications   Medication Dose Route Frequency Provider Last Rate Last Admin    lactated ringers IV soln infusion   IntraVENous Continuous Marsha Briggs MD        ceFAZolin (ANCEF) 2,000 mg in sterile water 20 mL IV syringe  2,000 mg IntraVENous Once Cyndi Goldstein MD        lidocaine PF 1 % injection 1 mL  1 mL IntraDERmal Once PRN Prudence Saint M, DO        acetaminophen (TYLENOL) tablet 1,000 mg  1,000 mg Oral Once Vanessa Cloud DO        fentaNYL (SUBLIMAZE) injection 100 mcg  100 mcg IntraVENous Once PRN Prudence Saint M, DO        lactated

## 2023-11-30 NOTE — FLOWSHEET NOTE
11/30/23 1350   AVS Reviewed   AVS & discharge instructions reviewed with patient and/or representative?  Yes   Reviewed instructions with Other (name and relationship in comment)  (wife Jose M Velez)   Level of Understanding Questions answered;Verbalized understanding

## 2023-11-30 NOTE — FLOWSHEET NOTE
11/30/23 1125   Handoff   Communication Given Periop Handoff/Relief   Handoff phase Phase I receiving   Handoff Given To Isabel Esparza RN   Handoff Received From [facility] KWABENA BOLAÑOS/Stephanie Brasher CRNA   Handoff Communication Face to Face; At bedside   Time Handoff Given [phone]

## 2023-12-15 ENCOUNTER — OFFICE VISIT (OUTPATIENT)
Age: 73
End: 2023-12-15
Payer: MEDICARE

## 2023-12-15 VITALS
WEIGHT: 188 LBS | DIASTOLIC BLOOD PRESSURE: 86 MMHG | OXYGEN SATURATION: 98 % | HEIGHT: 69 IN | HEART RATE: 87 BPM | RESPIRATION RATE: 20 BRPM | BODY MASS INDEX: 27.85 KG/M2 | SYSTOLIC BLOOD PRESSURE: 130 MMHG | TEMPERATURE: 98.2 F

## 2023-12-15 DIAGNOSIS — K43.2 RECURRENT INCISIONAL HERNIA: Primary | ICD-10-CM

## 2023-12-15 PROCEDURE — 3017F COLORECTAL CA SCREEN DOC REV: CPT | Performed by: SURGERY

## 2023-12-15 PROCEDURE — 99213 OFFICE O/P EST LOW 20 MIN: CPT | Performed by: SURGERY

## 2023-12-15 PROCEDURE — G8419 CALC BMI OUT NRM PARAM NOF/U: HCPCS | Performed by: SURGERY

## 2023-12-15 PROCEDURE — G8484 FLU IMMUNIZE NO ADMIN: HCPCS | Performed by: SURGERY

## 2023-12-15 PROCEDURE — 1123F ACP DISCUSS/DSCN MKR DOCD: CPT | Performed by: SURGERY

## 2023-12-15 PROCEDURE — G8427 DOCREV CUR MEDS BY ELIG CLIN: HCPCS | Performed by: SURGERY

## 2023-12-15 PROCEDURE — 1036F TOBACCO NON-USER: CPT | Performed by: SURGERY

## 2023-12-15 ASSESSMENT — PATIENT HEALTH QUESTIONNAIRE - PHQ9
SUM OF ALL RESPONSES TO PHQ QUESTIONS 1-9: 0
SUM OF ALL RESPONSES TO PHQ9 QUESTIONS 1 & 2: 0
SUM OF ALL RESPONSES TO PHQ QUESTIONS 1-9: 0
1. LITTLE INTEREST OR PLEASURE IN DOING THINGS: 0
SUM OF ALL RESPONSES TO PHQ QUESTIONS 1-9: 0
SUM OF ALL RESPONSES TO PHQ QUESTIONS 1-9: 0
2. FEELING DOWN, DEPRESSED OR HOPELESS: 0

## 2025-05-08 ENCOUNTER — HOSPITAL ENCOUNTER (EMERGENCY)
Facility: HOSPITAL | Age: 75
Discharge: HOME | End: 2025-05-08
Attending: EMERGENCY MEDICINE
Payer: MEDICARE

## 2025-05-08 ENCOUNTER — APPOINTMENT (OUTPATIENT)
Facility: HOSPITAL | Age: 75
End: 2025-05-08
Payer: MEDICARE

## 2025-05-08 VITALS
BODY MASS INDEX: 28.79 KG/M2 | TEMPERATURE: 98.8 F | RESPIRATION RATE: 24 BRPM | HEIGHT: 68 IN | HEART RATE: 78 BPM | OXYGEN SATURATION: 98 % | DIASTOLIC BLOOD PRESSURE: 101 MMHG | WEIGHT: 190 LBS | SYSTOLIC BLOOD PRESSURE: 154 MMHG

## 2025-05-08 DIAGNOSIS — R06.09 EXERTIONAL DYSPNEA: Primary | ICD-10-CM

## 2025-05-08 LAB
ALBUMIN SERPL-MCNC: 3.6 G/DL (ref 3.5–5)
ALBUMIN/GLOB SERPL: 1.1 (ref 1.1–2.2)
ALP SERPL-CCNC: 91 U/L (ref 45–117)
ALT SERPL-CCNC: 35 U/L (ref 12–78)
ANION GAP SERPL CALC-SCNC: 3 MMOL/L (ref 2–12)
AST SERPL-CCNC: 32 U/L (ref 15–37)
BASOPHILS # BLD: 0.06 K/UL (ref 0–0.1)
BASOPHILS NFR BLD: 0.8 % (ref 0–1)
BILIRUB SERPL-MCNC: 0.5 MG/DL (ref 0.2–1)
BUN SERPL-MCNC: 15 MG/DL (ref 6–20)
BUN/CREAT SERPL: 12 (ref 12–20)
CALCIUM SERPL-MCNC: 9.1 MG/DL (ref 8.5–10.1)
CHLORIDE SERPL-SCNC: 105 MMOL/L (ref 97–108)
CO2 SERPL-SCNC: 31 MMOL/L (ref 21–32)
CREAT SERPL-MCNC: 1.23 MG/DL (ref 0.7–1.3)
DIFFERENTIAL METHOD BLD: ABNORMAL
EKG ATRIAL RATE: 89 BPM
EKG DIAGNOSIS: NORMAL
EKG P AXIS: 34 DEGREES
EKG P-R INTERVAL: 152 MS
EKG Q-T INTERVAL: 340 MS
EKG QRS DURATION: 80 MS
EKG QTC CALCULATION (BAZETT): 413 MS
EKG R AXIS: -18 DEGREES
EKG T AXIS: 4 DEGREES
EKG VENTRICULAR RATE: 89 BPM
EOSINOPHIL # BLD: 0.17 K/UL (ref 0–0.4)
EOSINOPHIL NFR BLD: 2.3 % (ref 0–7)
ERYTHROCYTE [DISTWIDTH] IN BLOOD BY AUTOMATED COUNT: 12.9 % (ref 11.5–14.5)
GLOBULIN SER CALC-MCNC: 3.4 G/DL (ref 2–4)
GLUCOSE SERPL-MCNC: 97 MG/DL (ref 65–100)
HCT VFR BLD AUTO: 50.6 % (ref 36.6–50.3)
HGB BLD-MCNC: 16.3 G/DL (ref 12.1–17)
IMM GRANULOCYTES # BLD AUTO: 0.02 K/UL (ref 0–0.04)
IMM GRANULOCYTES NFR BLD AUTO: 0.3 % (ref 0–0.5)
LYMPHOCYTES # BLD: 0.45 K/UL (ref 0.8–3.5)
LYMPHOCYTES NFR BLD: 6 % (ref 12–49)
MAGNESIUM SERPL-MCNC: 2.3 MG/DL (ref 1.6–2.4)
MCH RBC QN AUTO: 29.2 PG (ref 26–34)
MCHC RBC AUTO-ENTMCNC: 32.2 G/DL (ref 30–36.5)
MCV RBC AUTO: 90.5 FL (ref 80–99)
MONOCYTES # BLD: 0.73 K/UL (ref 0–1)
MONOCYTES NFR BLD: 9.7 % (ref 5–13)
NEUTS SEG # BLD: 6.07 K/UL (ref 1.8–8)
NEUTS SEG NFR BLD: 80.9 % (ref 32–75)
NRBC # BLD: 0 K/UL (ref 0–0.01)
NRBC BLD-RTO: 0 PER 100 WBC
NT PRO BNP: 26 PG/ML
PLATELET # BLD AUTO: 186 K/UL (ref 150–400)
PMV BLD AUTO: 11.1 FL (ref 8.9–12.9)
POTASSIUM SERPL-SCNC: 4.7 MMOL/L (ref 3.5–5.1)
PROT SERPL-MCNC: 7 G/DL (ref 6.4–8.2)
RBC # BLD AUTO: 5.59 M/UL (ref 4.1–5.7)
RBC MORPH BLD: ABNORMAL
SODIUM SERPL-SCNC: 139 MMOL/L (ref 136–145)
TROPONIN I SERPL HS-MCNC: 7 NG/L (ref 0–76)
TROPONIN I SERPL HS-MCNC: 7 NG/L (ref 0–76)
WBC # BLD AUTO: 7.5 K/UL (ref 4.1–11.1)

## 2025-05-08 PROCEDURE — 36415 COLL VENOUS BLD VENIPUNCTURE: CPT

## 2025-05-08 PROCEDURE — 85025 COMPLETE CBC W/AUTO DIFF WBC: CPT

## 2025-05-08 PROCEDURE — 83735 ASSAY OF MAGNESIUM: CPT

## 2025-05-08 PROCEDURE — 84484 ASSAY OF TROPONIN QUANT: CPT

## 2025-05-08 PROCEDURE — 99285 EMERGENCY DEPT VISIT HI MDM: CPT

## 2025-05-08 PROCEDURE — 93005 ELECTROCARDIOGRAM TRACING: CPT | Performed by: EMERGENCY MEDICINE

## 2025-05-08 PROCEDURE — 71045 X-RAY EXAM CHEST 1 VIEW: CPT

## 2025-05-08 PROCEDURE — 83880 ASSAY OF NATRIURETIC PEPTIDE: CPT

## 2025-05-08 PROCEDURE — 80053 COMPREHEN METABOLIC PANEL: CPT

## 2025-05-08 ASSESSMENT — PAIN - FUNCTIONAL ASSESSMENT: PAIN_FUNCTIONAL_ASSESSMENT: NONE - DENIES PAIN

## 2025-05-08 NOTE — ED PROVIDER NOTES
[facility] EMERGENCY DEPARTMENT  EMERGENCY DEPARTMENT ENCOUNTER       Pt Name: Clive Cabello Jr  MRN: [id number]  Birthdate 1950  Date of evaluation: 5/8/2025  Provider: oJsie Pedersen MD   PCP: Jl Ernandez MD  Note Started: 11:16 AM EDT 5/8/25     CHIEF COMPLAINT       Chief Complaint   Patient presents with    Shortness of Breath     Pt presents ambulatory to triage w/ complaints of intermittent SOB x2 weeks that worsens upon ambulation or climbing the stairs. Denies CP, dizziness, or fevers at home         HISTORY OF PRESENT ILLNESS: 1 or more elements      History From: patient, History limited by: none     Clive Cabello Jr is a 74 y.o. male who presents with a chief complaint of exertional shortness of breath       Please See MDM for Additional Details of the HPI/PMH  Nursing Notes were all reviewed and agreed with or any disagreements were addressed in the HPI.     REVIEW OF SYSTEMS        Positives and Pertinent negatives as per HPI.    PAST HISTORY     Past Medical History:  Past Medical History:   Diagnosis Date    Arthritis     hands, knees    At risk for sleep apnea     BPH (benign prostatic hyperplasia)     with urinary incontinence    Frequent PVCs     With [facility]- Follows Dr Fer Abrams- [facility]    Prostate cancer (HCC)     Umbilical hernia        Past Surgical History:  Past Surgical History:   Procedure Laterality Date    CARDIAC ELECTROPHYSIOLOGY STUDY AND ABLATION  11/2023    COLONOSCOPY      HERNIA REPAIR  2022    umbilical    HERNIA REPAIR N/A 11/30/2023    Robotic assisted laparoscopic recurrent ventral incisional hernia repair with mesh performed by Moo Ho MD [facility] OR    PROSTATECTOMY  2022    TONSILLECTOMY      as a child       Family History:  Family History   Problem Relation Age of Onset    Heart Disease Father     Diabetes Paternal Grandmother     Heart Attack Paternal Grandmother     Stroke Paternal Grandmother        Social  assessment of his symptoms.  He denies feeling short of breath presently while at rest.  He denies any associated chest pain, palpitations.  No nausea, vomiting, fevers.  Denies any history of CAD or congestive heart failure.  Denies any PND or orthopnea.  Denies any increased lower extremity swelling.    Well-appearing on exam, hemodynamically stable.  Speaking in full and complete sentences without dyspnea.  Lungs are clear to auscultation.  Differential includes ACS, stable angina, CHF, electrolyte disturbance, anemia.    Will check basic lab work to evaluate for evidence of severe electrolyte derangements or anemia.  Will check troponin and ekg to evaluate for evidence of ACS  Will check BNP to evaluate for evidence of fluid overload/CHF  Will check chest XR to evaluate for evidence of focal airspace disease/fluid overload/PTX      Problems Addressed:  Exertional dyspnea: acute illness or injury    Amount and/or Complexity of Data Reviewed  Labs: ordered. Decision-making details documented in ED Course.  Radiology: ordered. Decision-making details documented in ED Course.  ECG/medicine tests: ordered and independent interpretation performed. Decision-making details documented in ED Course.          CLINICAL MANAGEMENT TOOLS:  Not Applicable      ED Course as of 05/10/25 0930   u May 08, 2025   1053 EKG performed at 10:35 AM shows a normal sinus rhythm with a rate of 89, no acute ischemic changes per my interpretation [VIKY]   1340 Lab work and imaging unremarkable.  Troponin negative x 2.  Patient able to ambulate with a steady gait without hypoxia.  Stable for discharge with outpatient follow-up with primary care and cardiology [VIKY]      ED Course User Index  [VIKY] Josie Pedersen MD             FINAL IMPRESSION     1. Exertional dyspnea          DISPOSITION/PLAN   Clive Cabello Jr's  results have been reviewed with him.  He has been counseled regarding his diagnosis, treatment, and plan.  He verbally

## 2025-05-08 NOTE — DISCHARGE INSTRUCTIONS
Thank You!    It was a pleasure taking care of you in our Emergency Department today. We know that when you come to our Emergency Department, you are entrusting us with your health, comfort, and safety. Our physicians and nurses honor that trust, and truly appreciate the opportunity to care for you and your loved ones.      We also value your feedback. If you receive a survey about your Emergency Department experience today, please fill it out.  We care about our patients' feedback, and we listen to what you have to say.  Thank you.    Josie Pedersen MD  ________________________________________________________________________  I have included a copy of your lab results and/or radiologic studies from today's visit so you can have them easily available at your follow-up visit. We hope you feel better and please do not hesitate to contact the ED if you have any questions at all!    Recent Results (from the past 12 hours)   EKG 12 Lead    Collection Time: 05/08/25 10:35 AM   Result Value Ref Range    Ventricular Rate 89 BPM    Atrial Rate 89 BPM    P-R Interval 152 ms    QRS Duration 80 ms    Q-T Interval 340 ms    QTc Calculation (Bazett) 413 ms    P Axis 34 degrees    R Axis -18 degrees    T Axis 4 degrees    Diagnosis       Normal sinus rhythm  Possible Left atrial enlargement  Possible Anterior infarct , age undetermined  Abnormal ECG    Confirmed by Josie Pedersen MD ([id number]) on 5/8/2025 10:53:25 AM     CBC with Auto Differential    Collection Time: 05/08/25 10:41 AM   Result Value Ref Range    WBC 7.5 4.1 - 11.1 K/uL    RBC 5.59 4.10 - 5.70 M/uL    Hemoglobin 16.3 12.1 - 17.0 g/dL    Hematocrit 50.6 (H) 36.6 - 50.3 %    MCV 90.5 80.0 - 99.0 FL    MCH 29.2 26.0 - 34.0 PG    MCHC 32.2 30.0 - 36.5 g/dL    RDW 12.9 11.5 - 14.5 %    Platelets 186 150 - 400 K/uL    MPV 11.1 8.9 - 12.9 FL    Nucleated RBCs 0.0 0  WBC    nRBC 0.00 0.00 - 0.01 K/uL    Neutrophils % 80.9 (H) 32.0 - 75.0 %    Lymphocytes % 6.0

## (undated) DEVICE — SUTURE V-LOC 180 SZ 0 L12IN ABSRB GRN L37MM GS-21 1/2 CIR VLOCL0316

## (undated) DEVICE — SOLUTION IRRIG 1000ML STRL H2O USP PLAS POUR BTL

## (undated) DEVICE — BLADELESS OBTURATOR: Brand: WECK VISTA

## (undated) DEVICE — DAVINCI PROSTATECTOMY MRMC: Brand: MEDLINE INDUSTRIES, INC.

## (undated) DEVICE — CANISTER, RIGID, 3000CC: Brand: MEDLINE INDUSTRIES, INC.

## (undated) DEVICE — HYPODERMIC SAFETY NEEDLE: Brand: MONOJECT

## (undated) DEVICE — GOWN,SIRUS,NONRNF,SETINSLV,XL,20/CS: Brand: MEDLINE

## (undated) DEVICE — ARM DRAPE

## (undated) DEVICE — CATH URETH INTMIT ROB 20FR FUN -- PLEASE USE 151715

## (undated) DEVICE — SUTURE V LOC 1 L18IN NONABSORBABLE GS-21 TAPERPOINT NDL VLOCN0327

## (undated) DEVICE — SUTURE VCRL SZ 4-0 L27IN ABSRB UD L19MM PS-2 3/8 CIR PRIM J426H

## (undated) DEVICE — SOLUTION ANTIFOG VIS SYS CLEARIFY LAPSCP

## (undated) DEVICE — TISSUE RETRIEVAL SYSTEM: Brand: INZII RETRIEVAL SYSTEM

## (undated) DEVICE — 40580 - THE PINK PAD - ADVANCED TRENDELENBURG POSITIONING KIT: Brand: 40580 - THE PINK PAD - ADVANCED TRENDELENBURG POSITIONING KIT

## (undated) DEVICE — AIRSEAL 12 MM ACCESS PORT AND PALM GRIP OBTURATOR WITH BLADELESS OPTICAL TIP, 100 MM LENGTH: Brand: AIRSEAL

## (undated) DEVICE — SEAL UNIV 5-8MM DISP BX/10 -- DA VINCI XI - SNGL USE

## (undated) DEVICE — BINDER ABD M/L H12IN FOR 46-62IN WHT 4 SLD PNL DSGN HOOP

## (undated) DEVICE — GLOVE ORANGE PI 7 1/2   MSG9075

## (undated) DEVICE — 40418 TRENDELENBURG ONE-STEP ARM PROTECTORS LARGE (1 PAIR): Brand: 40418 TRENDELENBURG ONE-STEP ARM PROTECTORS LARGE (1 PAIR)

## (undated) DEVICE — JELLY,LUBE,STERILE,FLIP TOP,TUBE,4-OZ: Brand: MEDLINE

## (undated) DEVICE — COLUMN DRAPE

## (undated) DEVICE — DEVICE SECUREMENT 1/32IN POLYETH FOAM F ANCHR URIN CATH

## (undated) DEVICE — APPLICATOR SEAL FLOSEAL 5MM+ --

## (undated) DEVICE — TIP COVER ACCESSORY

## (undated) DEVICE — SUTURE PDS II SZ 1 L96IN ABSRB VLT TP-1 L65MM 1/2 CIR Z880G

## (undated) DEVICE — GENERAL LAPAROSCOPY-MRMC: Brand: MEDLINE INDUSTRIES, INC.

## (undated) DEVICE — COVER,MAYO STAND,STERILE: Brand: MEDLINE

## (undated) DEVICE — BLADE CLIPPER GEN PURP NS

## (undated) DEVICE — COVER MPLR TIP CRV SCIS ACC DA VINCI

## (undated) DEVICE — DRAIN SURG 19FR L025IN DIA63MM SIL CHN RND FULL FLUT CLS

## (undated) DEVICE — SYRINGE,TOOMEY,IRRIGATION,70CC,STERILE: Brand: MEDLINE

## (undated) DEVICE — DERMABOND SKIN ADH 0.7ML -- DERMABOND ADVANCED 12/BX

## (undated) DEVICE — SOLUTION IRRIGATION H2O 0797305] ICU MEDICAL INC]

## (undated) DEVICE — INSUFFLATION NEEDLE: Brand: SURGINEEDLE

## (undated) DEVICE — LIQUIBAND RAPID ADHESIVE 36/CS 0.8ML: Brand: MEDLINE

## (undated) DEVICE — CANNULA SEAL

## (undated) DEVICE — TROCAR: Brand: KII FIOS FIRST ENTRY

## (undated) DEVICE — SYRINGE MED 10ML LUERLOCK TIP W/O SFTY DISP

## (undated) DEVICE — GOWN,SIRUS,NONRNF,SETINSLV,2XL,18/CS: Brand: MEDLINE

## (undated) DEVICE — VESSEL SEALER: Brand: ENDOWRIST

## (undated) DEVICE — SUTURE PROL SZ 2-0 L30IN NONABSORBABLE BLU L26MM CT-1 1/2 8423H

## (undated) DEVICE — LAPAROSCOPIC TROCAR SLEEVE/SINGLE USE: Brand: KII® OPTICAL ACCESS SYSTEM

## (undated) DEVICE — TOWEL SURG W17XL27IN STD BLU COT NONFENESTRATED PREWASHED

## (undated) DEVICE — FLOSEAL MATRIX IS INDICATED IN SURGICAL PROCEDURES (OTHER THAN IN OPHTHALMIC) AS AN ADJUNCT TO HEMOSTASIS WHEN CONTROL OF BLEEDING BY LIGATURE OR CONVENTIONALPROCEDURES IS INEFFECTIVE OR IMPRACTICAL.: Brand: FLOSEAL HEMOSTATIC MATRIX

## (undated) DEVICE — POWER SHELL: Brand: SIGNIA

## (undated) DEVICE — SYR 20ML LL STRL LF --

## (undated) DEVICE — TRI-LUMEN FILTERED TUBE SET WITH ACTIVATED CHARCOAL FILTER: Brand: AIRSEAL

## (undated) DEVICE — SUTURE MCRYL SZ 4-0 L27IN ABSRB UD L19MM PS-2 1/2 CIR PRIM Y426H

## (undated) DEVICE — SPONGE HEMOSTAT CELLULS 4X8IN -- SURGICEL

## (undated) DEVICE — Device